# Patient Record
Sex: FEMALE | Race: WHITE | NOT HISPANIC OR LATINO | Employment: OTHER | URBAN - METROPOLITAN AREA
[De-identification: names, ages, dates, MRNs, and addresses within clinical notes are randomized per-mention and may not be internally consistent; named-entity substitution may affect disease eponyms.]

---

## 2023-02-28 ENCOUNTER — DOCUMENTATION (OUTPATIENT)
Dept: PRIMARY CARE | Facility: CLINIC | Age: 68
End: 2023-02-28
Payer: MEDICARE

## 2023-03-06 ENCOUNTER — TELEPHONE (OUTPATIENT)
Dept: PRIMARY CARE | Facility: CLINIC | Age: 68
End: 2023-03-06
Payer: MEDICARE

## 2023-03-06 DIAGNOSIS — Z79.4 TYPE 2 DIABETES MELLITUS WITH HYPERGLYCEMIA, WITH LONG-TERM CURRENT USE OF INSULIN (MULTI): Primary | ICD-10-CM

## 2023-03-06 DIAGNOSIS — E11.65 TYPE 2 DIABETES MELLITUS WITH HYPERGLYCEMIA, WITH LONG-TERM CURRENT USE OF INSULIN (MULTI): Primary | ICD-10-CM

## 2023-03-06 RX ORDER — GLIPIZIDE 5 MG/1
5 TABLET ORAL DAILY
COMMUNITY
Start: 2022-09-12 | End: 2023-06-13 | Stop reason: SDUPTHER

## 2023-03-06 RX ORDER — EZETIMIBE 10 MG/1
1 TABLET ORAL DAILY
COMMUNITY
Start: 2022-11-17 | End: 2023-08-15 | Stop reason: ALTCHOICE

## 2023-03-06 RX ORDER — CLOPIDOGREL BISULFATE 75 MG/1
1 TABLET ORAL DAILY
COMMUNITY
Start: 2022-04-21 | End: 2023-05-22 | Stop reason: ALTCHOICE

## 2023-03-06 RX ORDER — HYDROCORTISONE 25 MG/G
CREAM TOPICAL
COMMUNITY
Start: 2022-06-10

## 2023-03-06 RX ORDER — LISINOPRIL 20 MG/1
1 TABLET ORAL DAILY
COMMUNITY
End: 2023-06-13 | Stop reason: SDUPTHER

## 2023-03-06 RX ORDER — SEMAGLUTIDE 2.68 MG/ML
2 INJECTION, SOLUTION SUBCUTANEOUS
Qty: 3 ML | Refills: 5 | Status: SHIPPED | OUTPATIENT
Start: 2023-03-06 | End: 2023-03-07 | Stop reason: SDUPTHER

## 2023-03-06 RX ORDER — ASPIRIN 81 MG/1
1 TABLET ORAL DAILY
COMMUNITY
Start: 2022-03-10

## 2023-03-06 RX ORDER — CITALOPRAM 20 MG/1
1 TABLET, FILM COATED ORAL DAILY
COMMUNITY
Start: 2019-07-11 | End: 2024-02-14 | Stop reason: SDUPTHER

## 2023-03-06 NOTE — TELEPHONE ENCOUNTER
Pt called she said she had spoken to the pharmacist Jono and she has decided to increase her Ozempic to 2mg she says there is no more cost to her.  She says if you have any questions just call her.

## 2023-03-07 DIAGNOSIS — E11.9 DIABETES MELLITUS WITHOUT COMPLICATION (MULTI): Primary | ICD-10-CM

## 2023-03-07 RX ORDER — SEMAGLUTIDE 2.68 MG/ML
2 INJECTION, SOLUTION SUBCUTANEOUS
Qty: 3 ML | Refills: 5 | Status: SHIPPED | OUTPATIENT
Start: 2023-03-07 | End: 2023-08-11 | Stop reason: SDUPTHER

## 2023-03-07 RX ORDER — SEMAGLUTIDE 2.68 MG/ML
2 INJECTION, SOLUTION SUBCUTANEOUS
Qty: 3 ML | Refills: 3 | Status: CANCELLED | OUTPATIENT
Start: 2023-03-07 | End: 2023-06-05

## 2023-03-07 NOTE — PROGRESS NOTES
Prescription for Ozempic 2 mg sent to [facility] pharmacy on 3/7/2023.     Prescription did not transmit properly on 3/6/2023 due to systemwide transmission errors.

## 2023-03-09 ENCOUNTER — PATIENT OUTREACH (OUTPATIENT)
Dept: PRIMARY CARE | Facility: CLINIC | Age: 68
End: 2023-03-09
Payer: MEDICARE

## 2023-03-09 ENCOUNTER — TELEPHONE (OUTPATIENT)
Dept: PHARMACY | Facility: HOSPITAL | Age: 68
End: 2023-03-09
Payer: MEDICARE

## 2023-03-09 DIAGNOSIS — I10 HYPERTENSION, UNSPECIFIED TYPE: ICD-10-CM

## 2023-03-09 PROCEDURE — 99490 CHRNC CARE MGMT STAFF 1ST 20: CPT | Performed by: INTERNAL MEDICINE

## 2023-03-09 ASSESSMENT — ACTIVITIES OF DAILY LIVING (ADL): BATHING: YES

## 2023-03-09 NOTE — PROGRESS NOTES
"Outreach today.   Doing well. Discussed options for nutritional /dietary support. Patient is not interested at this time.   Outreach with pharmacist is \"great\" Good additional support.   Aware new CCM with be starting and aware they will outreach in the next month  "

## 2023-03-09 NOTE — TELEPHONE ENCOUNTER
Ozempic 2 mg will be delivered on 3/22/2023      Will follow up with patient on 4/5/2023 to review tolerability of Ozempic 2 mg. Advised patient to double up Ozempic 1 mg prescriptions until Ozempic 2 mg is received.

## 2023-04-21 ENCOUNTER — PATIENT OUTREACH (OUTPATIENT)
Dept: PRIMARY CARE | Facility: CLINIC | Age: 68
End: 2023-04-21
Payer: MEDICARE

## 2023-04-21 DIAGNOSIS — I10 HYPERTENSION, UNSPECIFIED TYPE: ICD-10-CM

## 2023-04-21 NOTE — PROGRESS NOTES
I called and spoke to the patient briefly about how she is doing. She said everything is going well. She is not in need of anything at this time. She talked with the pharmacist and got everything worked out with him. She requested I email her with my contact information so I will do that now.

## 2023-05-17 LAB
ALANINE AMINOTRANSFERASE (SGPT) (U/L) IN SER/PLAS: 19 U/L (ref 7–45)
ALBUMIN (G/DL) IN SER/PLAS: 4.1 G/DL (ref 3.4–5)
ALKALINE PHOSPHATASE (U/L) IN SER/PLAS: 81 U/L (ref 33–136)
ANION GAP IN SER/PLAS: 10 MMOL/L (ref 10–20)
ASPARTATE AMINOTRANSFERASE (SGOT) (U/L) IN SER/PLAS: 22 U/L (ref 9–39)
BILIRUBIN TOTAL (MG/DL) IN SER/PLAS: 0.7 MG/DL (ref 0–1.2)
CALCIUM (MG/DL) IN SER/PLAS: 9.3 MG/DL (ref 8.6–10.6)
CARBON DIOXIDE, TOTAL (MMOL/L) IN SER/PLAS: 29 MMOL/L (ref 21–32)
CHLORIDE (MMOL/L) IN SER/PLAS: 106 MMOL/L (ref 98–107)
CHOLESTEROL (MG/DL) IN SER/PLAS: 161 MG/DL (ref 0–199)
CHOLESTEROL IN HDL (MG/DL) IN SER/PLAS: 43.2 MG/DL
CHOLESTEROL/HDL RATIO: 3.7
CREATININE (MG/DL) IN SER/PLAS: 0.67 MG/DL (ref 0.5–1.05)
ESTIMATED AVERAGE GLUCOSE FOR HBA1C: 177 MG/DL
GFR FEMALE: >90 ML/MIN/1.73M2
GLUCOSE (MG/DL) IN SER/PLAS: 166 MG/DL (ref 74–99)
HEMOGLOBIN A1C/HEMOGLOBIN TOTAL IN BLOOD: 7.8 %
LDL: 92 MG/DL (ref 0–99)
POTASSIUM (MMOL/L) IN SER/PLAS: 4.4 MMOL/L (ref 3.5–5.3)
PROTEIN TOTAL: 6.4 G/DL (ref 6.4–8.2)
SODIUM (MMOL/L) IN SER/PLAS: 141 MMOL/L (ref 136–145)
TRIGLYCERIDE (MG/DL) IN SER/PLAS: 131 MG/DL (ref 0–149)
UREA NITROGEN (MG/DL) IN SER/PLAS: 16 MG/DL (ref 6–23)
VLDL: 26 MG/DL (ref 0–40)

## 2023-05-22 ENCOUNTER — OFFICE VISIT (OUTPATIENT)
Dept: PRIMARY CARE | Facility: CLINIC | Age: 68
End: 2023-05-22
Payer: MEDICARE

## 2023-05-22 VITALS
SYSTOLIC BLOOD PRESSURE: 121 MMHG | BODY MASS INDEX: 38.48 KG/M2 | DIASTOLIC BLOOD PRESSURE: 68 MMHG | WEIGHT: 196 LBS | HEART RATE: 62 BPM | HEIGHT: 60 IN

## 2023-05-22 DIAGNOSIS — F41.1 GAD (GENERALIZED ANXIETY DISORDER): ICD-10-CM

## 2023-05-22 DIAGNOSIS — I10 BENIGN ESSENTIAL HYPERTENSION: ICD-10-CM

## 2023-05-22 DIAGNOSIS — E66.01 CLASS 2 SEVERE OBESITY WITH BODY MASS INDEX (BMI) OF 35 TO 39.9 WITH SERIOUS COMORBIDITY (MULTI): ICD-10-CM

## 2023-05-22 DIAGNOSIS — I25.83 CORONARY ARTERY DISEASE DUE TO LIPID RICH PLAQUE: ICD-10-CM

## 2023-05-22 DIAGNOSIS — R42 VERTIGO: ICD-10-CM

## 2023-05-22 DIAGNOSIS — E11.65 TYPE 2 DIABETES MELLITUS WITH HYPERGLYCEMIA, WITHOUT LONG-TERM CURRENT USE OF INSULIN (MULTI): Primary | ICD-10-CM

## 2023-05-22 DIAGNOSIS — M17.0 PRIMARY OSTEOARTHRITIS OF BOTH KNEES: ICD-10-CM

## 2023-05-22 DIAGNOSIS — I25.10 CORONARY ARTERY DISEASE DUE TO LIPID RICH PLAQUE: ICD-10-CM

## 2023-05-22 PROBLEM — E66.812 CLASS 2 SEVERE OBESITY WITH BODY MASS INDEX (BMI) OF 35 TO 39.9 WITH SERIOUS COMORBIDITY: Status: ACTIVE | Noted: 2023-05-22

## 2023-05-22 PROBLEM — M79.10 MUSCLE PAIN: Status: RESOLVED | Noted: 2023-05-22 | Resolved: 2023-05-22

## 2023-05-22 PROBLEM — I20.9 ANGINA PECTORIS (CMS-HCC): Status: RESOLVED | Noted: 2023-05-22 | Resolved: 2023-05-22

## 2023-05-22 PROCEDURE — 1159F MED LIST DOCD IN RCRD: CPT | Performed by: INTERNAL MEDICINE

## 2023-05-22 PROCEDURE — 3051F HG A1C>EQUAL 7.0%<8.0%: CPT | Performed by: INTERNAL MEDICINE

## 2023-05-22 PROCEDURE — 3074F SYST BP LT 130 MM HG: CPT | Performed by: INTERNAL MEDICINE

## 2023-05-22 PROCEDURE — 99214 OFFICE O/P EST MOD 30 MIN: CPT | Performed by: INTERNAL MEDICINE

## 2023-05-22 PROCEDURE — 1036F TOBACCO NON-USER: CPT | Performed by: INTERNAL MEDICINE

## 2023-05-22 PROCEDURE — 3008F BODY MASS INDEX DOCD: CPT | Performed by: INTERNAL MEDICINE

## 2023-05-22 PROCEDURE — 4010F ACE/ARB THERAPY RXD/TAKEN: CPT | Performed by: INTERNAL MEDICINE

## 2023-05-22 PROCEDURE — 3078F DIAST BP <80 MM HG: CPT | Performed by: INTERNAL MEDICINE

## 2023-05-22 RX ORDER — CIPROFLOXACIN 250 MG/1
1 TABLET, FILM COATED ORAL 2 TIMES DAILY
COMMUNITY
Start: 2022-06-10 | End: 2023-05-22 | Stop reason: ALTCHOICE

## 2023-05-22 RX ORDER — SEMAGLUTIDE 1.34 MG/ML
2 INJECTION, SOLUTION SUBCUTANEOUS ONCE
COMMUNITY
Start: 2022-09-14 | End: 2023-08-11 | Stop reason: SDUPTHER

## 2023-05-22 RX ORDER — GLYBURIDE 5 MG/1
1 TABLET ORAL 2 TIMES DAILY
COMMUNITY
Start: 2022-08-19 | End: 2023-05-22 | Stop reason: ALTCHOICE

## 2023-05-22 RX ORDER — METOPROLOL TARTRATE 25 MG/1
1 TABLET, FILM COATED ORAL DAILY
COMMUNITY
Start: 2022-03-10 | End: 2023-05-22 | Stop reason: WASHOUT

## 2023-05-22 RX ORDER — MECLIZINE HYDROCHLORIDE 25 MG/1
25 TABLET ORAL 3 TIMES DAILY PRN
Qty: 90 TABLET | Refills: 1 | Status: SHIPPED | OUTPATIENT
Start: 2023-05-22

## 2023-05-22 RX ORDER — MELOXICAM 7.5 MG/1
7.5 TABLET ORAL DAILY
Qty: 180 TABLET | Refills: 0 | Status: SHIPPED | OUTPATIENT
Start: 2023-05-22 | End: 2023-11-14 | Stop reason: SDUPTHER

## 2023-05-22 RX ORDER — CEPHALEXIN 500 MG/1
CAPSULE ORAL
COMMUNITY
Start: 2022-09-21 | End: 2023-05-22 | Stop reason: ALTCHOICE

## 2023-05-22 RX ORDER — IBUPROFEN 600 MG/1
TABLET ORAL
COMMUNITY
Start: 2022-11-11 | End: 2023-11-13 | Stop reason: WASHOUT

## 2023-05-22 RX ORDER — METOPROLOL SUCCINATE 25 MG/1
1 TABLET, EXTENDED RELEASE ORAL DAILY
COMMUNITY
Start: 2023-03-11 | End: 2024-02-15

## 2023-05-22 RX ORDER — EVOLOCUMAB 140 MG/ML
INJECTION, SOLUTION SUBCUTANEOUS
COMMUNITY
Start: 2022-09-21 | End: 2023-05-22 | Stop reason: WASHOUT

## 2023-05-22 RX ORDER — BLOOD-GLUCOSE METER
EACH MISCELLANEOUS
COMMUNITY
Start: 2019-02-06

## 2023-05-22 NOTE — PATIENT INSTRUCTIONS
Try meloxicam : take either one twice a day, or two at one time, once a day.  Take with food.  Call [facility] for scheduling colonoscopy.   See me in 3 months for wellness visit.   Get blood test done close to next visit.  Order in the lab.

## 2023-05-22 NOTE — PROGRESS NOTES
Subjective   Maria Esther Schuster is a 67 y.o. female who presents for Diabetes.    She c/o pain from arthritis . Has been planting since March in her gardens.   Her knees especially are aching.  She uses acetaminophen or ibuprofen as needed.  She wanted to know if there was something else that could help her.    She says that the ezetimibe causes her some musculoskeletal symptoms as well so she will periodically take a break from it.  Discussed goal of LDL less than 70 for diabetes with heart disease.    She started the 2 mg dose of ozempic and this seems to be helping with her sugars.  She is on max dose of ozempic.  She remains on glipizide 5 mg once daily.  Her A1c was 7.8% which was up from 7.1 in February.    She says she saw [facility] and was advised to call to schedule a colonoscopy when she was done with plavix.  She denies any chest pain or shortness of breath.    She still has vertigo periodically and would like a prescription for meclizine.  Review of Systems    Objective   /68   Pulse 62   Ht 1.524 m (5')   Wt 88.9 kg (196 lb)   BMI 38.28 kg/m²    Physical Exam  Visit Vitals  /68   Pulse 62   Ht 1.524 m (5')   Wt 88.9 kg (196 lb)   BMI 38.28 kg/m²   Smoking Status Never   BSA 1.94 m²      GEN: NAD  HEENT: normal  NECK: no adenopathy, no thyroid enlargment  LUNGS: CTAB  CV: reg S1/S2 no murmurs  EXT: no leg edema   Assessment/Plan   Problem List Items Addressed This Visit    None    #1 type 2 diabetes mellitus with hyperglycemia without long-term insulin use: Right now she is on Ozempic at 2 mg daily which is the max dose.  She is also on glipizide.  She is paying $300 a month for Ozempic since she is in the donut hole.  We could add either an SGLT2 however there be cost issue and possible  side effects.  Could also increase glipizide but that would be working against the GLP 1 med.  Check A1c again in 3 months.  2.  Hypertension: She is on lisinopril 20 mg daily and  metoprolol succinate 25 mg once daily.  3.  Coronary artery disease: She is on metoprolol succinate 25 daily.  Also on ezetimibe.  4.  Hyperlipidemia: She was not able to tolerate Repatha when it was tried.  Continue on ezetimibe for now.  Cardiology has also tried statins with her and she stopped due to side effects.  5.  BPPV: Continue on meclizine as needed.  Rx for number 90 tablets was sent.  6.  Generalized anxiety: She is taking 10 mg daily of citalopram.  See me again in 3 months for Medicare wellness visit.

## 2023-05-24 ENCOUNTER — PATIENT OUTREACH (OUTPATIENT)
Dept: PRIMARY CARE | Facility: CLINIC | Age: 68
End: 2023-05-24
Payer: MEDICARE

## 2023-06-01 ENCOUNTER — TELEPHONE (OUTPATIENT)
Dept: PHARMACY | Facility: HOSPITAL | Age: 68
End: 2023-06-01
Payer: MEDICARE

## 2023-06-01 NOTE — TELEPHONE ENCOUNTER
Patient Assistance Program Approval:     We are pleased to inform you that your application for assistance has been approved.     This approval is valid through  06/01/2024  as long as the following criteria continue to be satisfied:     Your medication (Ozempic) remains covered under your current insurance plan.   Your prescriber does not discontinue therapy.   You do not seek reimbursement from any other private or government-funded programs for the medication.    Under this program, the pharmacy will first bill your insurance plan for your indemnified specified medication. The [facility] Assistance Fund will then offset your copay balance, so that your out-of pocket expense for your specialty medication will be $0.00.    Chris Maurer, PharmD  [phone]

## 2023-06-12 ENCOUNTER — TELEPHONE (OUTPATIENT)
Dept: PRIMARY CARE | Facility: CLINIC | Age: 68
End: 2023-06-12
Payer: MEDICARE

## 2023-06-12 RX ORDER — GLIPIZIDE 5 MG/1
5 TABLET ORAL DAILY
Qty: 90 TABLET | Refills: 0 | Status: CANCELLED | OUTPATIENT
Start: 2023-06-12

## 2023-06-12 RX ORDER — LISINOPRIL 20 MG/1
20 TABLET ORAL DAILY
Qty: 90 TABLET | Refills: 0 | Status: CANCELLED | OUTPATIENT
Start: 2023-06-12

## 2023-06-12 NOTE — TELEPHONE ENCOUNTER
Pt needs a refill on Lisinopril 20 mg and Glipizide 5mg.  [facility] on [address] in [place]. [phone]  Thank you!

## 2023-06-13 DIAGNOSIS — E11.65 TYPE 2 DIABETES MELLITUS WITH HYPERGLYCEMIA, WITHOUT LONG-TERM CURRENT USE OF INSULIN (MULTI): ICD-10-CM

## 2023-06-13 DIAGNOSIS — I10 BENIGN ESSENTIAL HYPERTENSION: Primary | ICD-10-CM

## 2023-06-13 RX ORDER — LISINOPRIL 20 MG/1
20 TABLET ORAL DAILY
Qty: 90 TABLET | Refills: 3 | Status: SHIPPED | OUTPATIENT
Start: 2023-06-13 | End: 2023-11-26 | Stop reason: DRUGHIGH

## 2023-06-13 RX ORDER — GLIPIZIDE 5 MG/1
5 TABLET ORAL DAILY
Qty: 90 TABLET | Refills: 3 | Status: SHIPPED | OUTPATIENT
Start: 2023-06-13 | End: 2024-05-16 | Stop reason: SDUPTHER

## 2023-07-11 ENCOUNTER — TELEPHONE (OUTPATIENT)
Dept: PHARMACY | Facility: HOSPITAL | Age: 68
End: 2023-07-11
Payer: MEDICARE

## 2023-07-11 NOTE — TELEPHONE ENCOUNTER
Ozempic 2 mg is currently on backorder though [facility] pharmacy. Patient is enrolled with [facility] patient assistance program and has one dose remaining this week for Ozempic 2 mg. I will review our inventory on Monday 7/17/2023 to determine if the 2 mg has returned to stock. If not, I will send an updated prescription for Ozempic 1 mg.    Chris Maurer, PharmD  [phone]

## 2023-08-11 ENCOUNTER — TELEMEDICINE (OUTPATIENT)
Dept: PHARMACY | Facility: HOSPITAL | Age: 68
End: 2023-08-11
Payer: MEDICARE

## 2023-08-11 DIAGNOSIS — E11.65 TYPE 2 DIABETES MELLITUS WITH HYPERGLYCEMIA, WITHOUT LONG-TERM CURRENT USE OF INSULIN (MULTI): Primary | ICD-10-CM

## 2023-08-11 RX ORDER — SEMAGLUTIDE 2.68 MG/ML
2 INJECTION, SOLUTION SUBCUTANEOUS
Qty: 9 ML | Refills: 3 | Status: SHIPPED | OUTPATIENT
Start: 2023-08-11 | End: 2023-08-11

## 2023-08-11 NOTE — PROGRESS NOTES
Patient is sent at the request of Leighann Thomas DO for my opinion regarding Type 2 diabetes.  My final recommendations will be communicated back to the requesting provider by way of shared medical record.    Subjective     No Known Allergies    Current monitoring regimen:   Patient is using: glucometer    7 days: 145 mg/dL   14 days: 153 mg/dL  30 days: 143 mg/dL  90 days: 153 mg/dL     Any episodes of hypoglycemia? no    Adverse Effects:   None    Objective     There were no vitals taken for this visit.    Diabetes Pharmacotherapy:    Ozempic 2 mg: Inject 2 mg under the skin once weekly    Glipizide 5 mg: Take one tablet by mouth once daily      SECONDARY PREVENTION  - Statin? No - Ezetimibe 10 mg   - ACE-I/ARB? Yes - Lisinopril 20 mg   - Aspirin? Yes    Pertinent PMH Review:  - PMH of Pancreatitis: No  - PMH of Retinopathy: No  - PMH of Urinary Tract Infections: Acute historical UTI    Lab Review  Lab Results   Component Value Date    BILITOT 0.7 05/17/2023    CALCIUM 9.3 05/17/2023    CO2 29 05/17/2023     05/17/2023    CREATININE 0.67 05/17/2023    GLUCOSE 166 (H) 05/17/2023    ALKPHOS 81 05/17/2023    K 4.4 05/17/2023    PROT 6.4 05/17/2023     05/17/2023    AST 22 05/17/2023    ALT 19 05/17/2023    BUN 16 05/17/2023    ANIONGAP 10 05/17/2023    ALBUMIN 4.1 05/17/2023    GFRF >90 05/17/2023     Lab Results   Component Value Date    TRIG 131 05/17/2023    CHOL 161 05/17/2023    HDL 43.2 05/17/2023     Component      Latest Ref Rng 11/8/2022 5/17/2023   LDL      0 - 99 mg/dL 77  92      Lab Results   Component Value Date    HGBA1C 7.8 (A) 05/17/2023    HGBA1C 7.1 (A) 02/15/2023    HGBA1C 7.5 (A) 09/19/2022     The 10-year ASCVD risk score (Jose Alfredo GARDNER, et al., 2019) is: 13.7%    Values used to calculate the score:      Age: 68 years      Sex: Female      Is Non- : No      Diabetic: Yes      Tobacco smoker: No      Systolic Blood Pressure: 121 mmHg      Is BP treated: Yes       HDL Cholesterol: 43.2 mg/dL      Total Cholesterol: 161 mg/dL    Health Maintenance:   Lipid Panel: 05/17/2023 - LDL 92 mg/dL (Goal <70 mg/dL)  Urine Albumin: 09/19/2022 WNL     Drug Interactions:  Glipizide + Ozempic: Glucagon-Like Peptide-1 Agonists may enhance the hypoglycemic effect of Sulfonylureas  Ibuprofen + Meloxicam:  Nonsteroidal Anti-Inflammatory Agents may enhance the adverse/toxic effect of other Nonsteroidal Anti-Inflammatory Agents    Assessment/Plan   Problem List Items Addressed This Visit       BMI 38.0-38.9,adult    Relevant Medications    semaglutide (Ozempic) 2 mg/dose (8 mg/3 mL) pen injector     Other Visit Diagnoses       Type 2 diabetes mellitus with hyperglycemia, without long-term current use of insulin (CMS/[facility])    -  Primary    Relevant Medications    semaglutide (Ozempic) 2 mg/dose (8 mg/3 mL) pen injector          Patients diabetes is  uncontrolled  with most recent A1c of 7.8% (Goal < 7%).   Continue: Ozempic 2 mg once weekly and Glipizide 5 mg daily   Compliance at present is estimated to be good.   Follow-up: I recommend diabetes care be PRN.  PCP Follow-Up: 08/15/2023    Chris Maurer, PharmD    Continue all meds under the continuation of care with the referring provider and clinical pharmacy team.

## 2023-08-15 ENCOUNTER — OFFICE VISIT (OUTPATIENT)
Dept: PRIMARY CARE | Facility: CLINIC | Age: 68
End: 2023-08-15
Payer: MEDICARE

## 2023-08-15 VITALS
SYSTOLIC BLOOD PRESSURE: 116 MMHG | WEIGHT: 194 LBS | HEIGHT: 60 IN | HEART RATE: 64 BPM | DIASTOLIC BLOOD PRESSURE: 50 MMHG | BODY MASS INDEX: 38.09 KG/M2

## 2023-08-15 DIAGNOSIS — Z00.00 ENCOUNTER FOR PREVENTATIVE ADULT HEALTH CARE EXAMINATION: ICD-10-CM

## 2023-08-15 DIAGNOSIS — Z13.89 ENCOUNTER FOR SCREENING FOR OTHER DISORDER: ICD-10-CM

## 2023-08-15 DIAGNOSIS — Z12.31 VISIT FOR SCREENING MAMMOGRAM: Primary | ICD-10-CM

## 2023-08-15 DIAGNOSIS — Z78.0 POSTMENOPAUSAL STATE: ICD-10-CM

## 2023-08-15 DIAGNOSIS — E11.65 TYPE 2 DIABETES MELLITUS WITH HYPERGLYCEMIA, WITHOUT LONG-TERM CURRENT USE OF INSULIN (MULTI): ICD-10-CM

## 2023-08-15 LAB — POC HEMOGLOBIN A1C: 7.2 % (ref 4.2–6.5)

## 2023-08-15 PROCEDURE — 3078F DIAST BP <80 MM HG: CPT | Performed by: INTERNAL MEDICINE

## 2023-08-15 PROCEDURE — 3074F SYST BP LT 130 MM HG: CPT | Performed by: INTERNAL MEDICINE

## 2023-08-15 PROCEDURE — 83036 HEMOGLOBIN GLYCOSYLATED A1C: CPT | Performed by: INTERNAL MEDICINE

## 2023-08-15 PROCEDURE — 1170F FXNL STATUS ASSESSED: CPT | Performed by: INTERNAL MEDICINE

## 2023-08-15 PROCEDURE — G0439 PPPS, SUBSEQ VISIT: HCPCS | Performed by: INTERNAL MEDICINE

## 2023-08-15 PROCEDURE — 1160F RVW MEDS BY RX/DR IN RCRD: CPT | Performed by: INTERNAL MEDICINE

## 2023-08-15 PROCEDURE — 3051F HG A1C>EQUAL 7.0%<8.0%: CPT | Performed by: INTERNAL MEDICINE

## 2023-08-15 PROCEDURE — 1036F TOBACCO NON-USER: CPT | Performed by: INTERNAL MEDICINE

## 2023-08-15 PROCEDURE — 1126F AMNT PAIN NOTED NONE PRSNT: CPT | Performed by: INTERNAL MEDICINE

## 2023-08-15 PROCEDURE — 1159F MED LIST DOCD IN RCRD: CPT | Performed by: INTERNAL MEDICINE

## 2023-08-15 PROCEDURE — 3008F BODY MASS INDEX DOCD: CPT | Performed by: INTERNAL MEDICINE

## 2023-08-15 PROCEDURE — 4010F ACE/ARB THERAPY RXD/TAKEN: CPT | Performed by: INTERNAL MEDICINE

## 2023-08-15 PROCEDURE — G0442 ANNUAL ALCOHOL SCREEN 15 MIN: HCPCS | Performed by: INTERNAL MEDICINE

## 2023-08-15 ASSESSMENT — ACTIVITIES OF DAILY LIVING (ADL)
MANAGING_FINANCES: INDEPENDENT
BATHING: INDEPENDENT
GROCERY_SHOPPING: INDEPENDENT
TAKING_MEDICATION: INDEPENDENT
DRESSING: INDEPENDENT
DOING_HOUSEWORK: INDEPENDENT

## 2023-08-15 ASSESSMENT — ENCOUNTER SYMPTOMS: LOSS OF SENSATION IN FEET: 0

## 2023-08-15 ASSESSMENT — PAIN SCALES - GENERAL: PAINLEVEL: 0-NO PAIN

## 2023-08-15 NOTE — PROGRESS NOTES
Subjective   Reason for Visit: Maria Esther Schuster is an 68 y.o. female here for a Medicare Wellness visit.          Reviewed all medications by prescribing practitioner or clinical pharmacist (such as prescriptions, OTCs, herbal therapies and supplements) and documented in the medical record.    HPI  She continues to feel well   She has not had any shortness of breath    She mentions that allergy season is coming    Her sugars have been going good  Her last sugar was 146 from yesterday    Her Aic had improved to 7.2% from 7.8    Patient Care Team:  Leighann Thomas DO as PCP - General  Leighann Thomas DO as PCP - [employer] PCP     Review of Systems    Objective   Vitals:  /50 (BP Location: Right arm, Patient Position: Sitting, BP Cuff Size: Adult)   Pulse 64   Ht 1.524 m (5')   Wt 88 kg (194 lb)   BMI 37.89 kg/m²       Physical Exam  Visit Vitals  /50 (BP Location: Right arm, Patient Position: Sitting, BP Cuff Size: Adult)   Pulse 64   Ht 1.524 m (5')   Wt 88 kg (194 lb)   BMI 37.89 kg/m²   Smoking Status Never   BSA 1.93 m²      GEN: NAD  HEENT: normal  NECK: no adenopathy, no thyroid enlargment  LUNGS: CTAB  CV: reg S1/S2 no murmurs  EXT: no leg edema   Assessment/Plan   Problem List Items Addressed This Visit       Encounter for preventative adult health care examination    Type 2 diabetes mellitus with hyperglycemia, without long-term current use of insulin (CMS/[facility])    Relevant Orders    Lipid Panel    Hemoglobin A1C    Albumin , Urine Random    Creatinine, Urine Random    POCT glycosylated hemoglobin (Hb A1C) manually resulted (Completed)    Postmenopausal state    Relevant Orders    XR DEXA bone density     Other Visit Diagnoses       Visit for screening mammogram    -  Primary    Relevant Orders    BI mammo bilateral screening tomosynthesis

## 2023-08-15 NOTE — PATIENT INSTRUCTIONS
Your Aic was 7.2.    Remain on same meds.     See me in 3 months .    Get blood test and urine test for diabetes close to next visit.

## 2023-08-29 PROBLEM — Z78.0 POSTMENOPAUSAL STATE: Status: ACTIVE | Noted: 2023-08-29

## 2023-08-29 PROBLEM — E11.65 TYPE 2 DIABETES MELLITUS WITH HYPERGLYCEMIA, WITHOUT LONG-TERM CURRENT USE OF INSULIN (MULTI): Status: ACTIVE | Noted: 2023-08-29

## 2023-08-29 PROBLEM — Z00.00 ENCOUNTER FOR PREVENTATIVE ADULT HEALTH CARE EXAMINATION: Status: ACTIVE | Noted: 2023-08-29

## 2023-08-29 ASSESSMENT — PATIENT HEALTH QUESTIONNAIRE - PHQ9
1. LITTLE INTEREST OR PLEASURE IN DOING THINGS: NOT AT ALL
2. FEELING DOWN, DEPRESSED OR HOPELESS: NOT AT ALL
SUM OF ALL RESPONSES TO PHQ9 QUESTIONS 1 AND 2: 0

## 2023-09-07 ENCOUNTER — TELEPHONE (OUTPATIENT)
Dept: PRIMARY CARE | Facility: CLINIC | Age: 68
End: 2023-09-07
Payer: MEDICARE

## 2023-09-12 ENCOUNTER — TELEPHONE (OUTPATIENT)
Dept: PRIMARY CARE | Facility: CLINIC | Age: 68
End: 2023-09-12
Payer: MEDICARE

## 2023-09-12 NOTE — TELEPHONE ENCOUNTER
Pt called and has a cough and isn't feeling well. Home Covid test was negative. Advised her to go to Urgent care since it is so late in the day.

## 2023-10-09 ENCOUNTER — HOSPITAL ENCOUNTER (OUTPATIENT)
Dept: RADIOLOGY | Facility: CLINIC | Age: 68
Discharge: HOME | End: 2023-10-09
Payer: MEDICARE

## 2023-10-09 DIAGNOSIS — Z12.31 ENCOUNTER FOR SCREENING MAMMOGRAM FOR MALIGNANT NEOPLASM OF BREAST: ICD-10-CM

## 2023-10-09 PROCEDURE — 77063 BREAST TOMOSYNTHESIS BI: CPT

## 2023-10-09 PROCEDURE — 77067 SCR MAMMO BI INCL CAD: CPT | Mod: BILATERAL PROCEDURE | Performed by: RADIOLOGY

## 2023-10-09 PROCEDURE — 77063 BREAST TOMOSYNTHESIS BI: CPT | Mod: BILATERAL PROCEDURE | Performed by: RADIOLOGY

## 2023-10-09 PROCEDURE — 77067 SCR MAMMO BI INCL CAD: CPT

## 2023-10-10 DIAGNOSIS — R92.8 ABNORMAL MAMMOGRAM OF RIGHT BREAST: Primary | ICD-10-CM

## 2023-10-11 ENCOUNTER — TELEPHONE (OUTPATIENT)
Dept: PRIMARY CARE | Facility: CLINIC | Age: 68
End: 2023-10-11
Payer: MEDICARE

## 2023-10-11 NOTE — TELEPHONE ENCOUNTER
Patient called stating she received a phone call from our office. Cannot find one in messages or her chart. Patient thought it might be in regards to her mammogram results. Did you call.  [phone]

## 2023-10-17 ENCOUNTER — ANCILLARY PROCEDURE (OUTPATIENT)
Dept: RADIOLOGY | Facility: CLINIC | Age: 68
End: 2023-10-17
Payer: MEDICARE

## 2023-10-17 DIAGNOSIS — R92.8 ABNORMAL MAMMOGRAM OF RIGHT BREAST: ICD-10-CM

## 2023-10-17 PROCEDURE — 76642 ULTRASOUND BREAST LIMITED: CPT | Mod: RIGHT SIDE | Performed by: RADIOLOGY

## 2023-10-17 PROCEDURE — 76642 ULTRASOUND BREAST LIMITED: CPT | Mod: RT

## 2023-10-17 PROCEDURE — 77065 DX MAMMO INCL CAD UNI: CPT | Mod: RT

## 2023-10-17 PROCEDURE — G0279 TOMOSYNTHESIS, MAMMO: HCPCS | Mod: RIGHT SIDE | Performed by: RADIOLOGY

## 2023-10-17 PROCEDURE — 77065 DX MAMMO INCL CAD UNI: CPT | Mod: RIGHT SIDE | Performed by: RADIOLOGY

## 2023-10-23 ENCOUNTER — TELEPHONE (OUTPATIENT)
Dept: PRIMARY CARE | Facility: CLINIC | Age: 68
End: 2023-10-23

## 2023-10-23 ENCOUNTER — LAB (OUTPATIENT)
Dept: LAB | Facility: LAB | Age: 68
End: 2023-10-23
Payer: MEDICARE

## 2023-10-23 DIAGNOSIS — E11.65 TYPE 2 DIABETES MELLITUS WITH HYPERGLYCEMIA, WITHOUT LONG-TERM CURRENT USE OF INSULIN (MULTI): ICD-10-CM

## 2023-10-23 DIAGNOSIS — N63.10 MASS OF RIGHT BREAST, UNSPECIFIED QUADRANT: Primary | ICD-10-CM

## 2023-10-23 LAB
CHOLEST SERPL-MCNC: 223 MG/DL (ref 0–199)
CHOLESTEROL/HDL RATIO: 4.3
EST. AVERAGE GLUCOSE BLD GHB EST-MCNC: 151 MG/DL
HBA1C MFR BLD: 6.9 %
HDLC SERPL-MCNC: 51.3 MG/DL
LDLC SERPL CALC-MCNC: 138 MG/DL
NON HDL CHOLESTEROL: 172 MG/DL (ref 0–149)
T4 FREE SERPL-MCNC: 0.89 NG/DL (ref 0.78–1.48)
TRIGL SERPL-MCNC: 168 MG/DL (ref 0–149)
TSH SERPL-ACNC: 6.57 MIU/L (ref 0.44–3.98)
VLDL: 34 MG/DL (ref 0–40)

## 2023-10-23 PROCEDURE — 84439 ASSAY OF FREE THYROXINE: CPT

## 2023-10-23 PROCEDURE — 80061 LIPID PANEL: CPT

## 2023-10-23 PROCEDURE — 83036 HEMOGLOBIN GLYCOSYLATED A1C: CPT

## 2023-10-23 PROCEDURE — 36415 COLL VENOUS BLD VENIPUNCTURE: CPT

## 2023-10-23 PROCEDURE — 84443 ASSAY THYROID STIM HORMONE: CPT

## 2023-10-23 NOTE — TELEPHONE ENCOUNTER
PT came in and said she needs a referral for  biopsy from her mammogram  Maria Esther [phone]

## 2023-11-01 DIAGNOSIS — R92.8 ABNORMAL FINDING ON BREAST IMAGING: Primary | ICD-10-CM

## 2023-11-02 ENCOUNTER — TELEPHONE (OUTPATIENT)
Dept: PRIMARY CARE | Facility: CLINIC | Age: 68
End: 2023-11-02

## 2023-11-02 ENCOUNTER — LAB (OUTPATIENT)
Dept: LAB | Facility: LAB | Age: 68
End: 2023-11-02
Payer: MEDICARE

## 2023-11-02 DIAGNOSIS — E11.65 TYPE 2 DIABETES MELLITUS WITH HYPERGLYCEMIA, WITHOUT LONG-TERM CURRENT USE OF INSULIN (MULTI): ICD-10-CM

## 2023-11-02 PROBLEM — T46.6X5A STATIN MYOPATHY: Status: ACTIVE | Noted: 2023-11-02

## 2023-11-02 PROBLEM — G72.0 STATIN MYOPATHY: Status: ACTIVE | Noted: 2023-11-02

## 2023-11-02 PROCEDURE — 82043 UR ALBUMIN QUANTITATIVE: CPT

## 2023-11-02 PROCEDURE — 82570 ASSAY OF URINE CREATININE: CPT

## 2023-11-03 LAB
CREAT UR-MCNC: 135.1 MG/DL (ref 20–320)
MICROALBUMIN UR-MCNC: 7 MG/L
MICROALBUMIN/CREAT UR: 5.2 UG/MG CREAT

## 2023-11-09 NOTE — PROGRESS NOTES
Chief Complaint  Abnormal right breast imaging.    History of Present Illness    Referring Provider: William PEREIRA    68 year-old non-Ashkenazi Taoist,  female here with abnormal right breast imaging  She is here with her .    History:  1) prior cyst aspiration  2) 10 9, 2023.  Bilateral screening mammogram.  Scattered fibroglandular tissue bilaterally.  Left breast negative.  Right breast mass, BI-RADS 0.  3) 2023.  Right breast diagnostic imaging.  Scattered fibroglandular tissue.  Right breast mass is confirmed on additional mammographic views.  Several small circumscribed low-density benign masses are identified in the remainder of the right breast.  Targeted right breast ultrasound.  At 2:00 2 cm from the nipple a 0.5 x 0.5 x 0.3 cm irregular hypoechoic mass was noted that corresponds to the mass seen on mammogram.  BI-RADS 4.  At 2:00 5 cm from the nipple there is a benign cyst.  Repeat right breast ultrasound and possible biopsy is recommended.  Right axillary ultrasound has not noted.    She presents today for further management and surgical discussion.    Ob/gyn history:  Menarche 15  Menopause 47  , age of first delivery 20  no OCPs, no fertility treatments, no HRT    No family history of breast or ovarian cancer.     Review of systems  A comprehensive ROS was taken on the patient intake form and reviewed with the patient. This form is scanned into the electronic medical record.    Constitutional symptoms: Denies generalized fatigue. Denies weight change, fevers/chills, difficulty sleeping   Eyes: Denies double vision, glaucoma, cataracts.  Ear/nose/throat/mouth: Denies hearing changes, sore throat, sinus problems.  Cardiovascular: No chest pain. Denies irregular heartbeat. Denies ankle swelling.  Respiratory: No wheezing, cough, or shortness of breath.  Gastrointestinal: No abdominal pain, No nausea/vomiting. No indigestion/heartburn. No change in bowel habits. No constipation  or diarrhea.   Genitourinary: No urinary incontinence. No urinary frequency. No painful urination.  Musculoskeletal: No bone pain, no muscle pain, no joint pain.   Integumentary: No rash. No masses. No changes in moles. No easy bruising.  Neurological: No headaches. No tremors. No numbness/tingling.  Psychiatric: No anxiety. No depression.  Endocrine: No excessive thirst. Not too hot or too cold. Not tired or fatigued.   Hematological/lymphatic: No swollen glands or blood clotting problems. No bruising.     Physical exam  A chaperone was offered for all portions of the physical exam. The patient declined.     General appearance: appears stated age, alert and oriented x 3  Head: Normocephalic, atraumatic  Eyes: conjunctivae/corneas clear.  Ears: External ears are normal, hearing is grossly intact.  Lungs: normal breathing  Heart: regular   Abdomen: Soft, nontender, nondistended.  Neurologic: grossly normal  Lymph nodes: No cervical, supraclavicular or axillary lymphadenopathy bilaterally    Breast: A comprehensive breast exam was performed in the seated and supine positions. Breasts are symmetrical. Bilateral nipples are everted. There are no skin changes on arm maneuvers. Bra size: 42C   Right: no masses   Left: no masses    Results  Imaging  All breast imaging personally reviewed by me.  See HPI    Pathology  none    Impression:   1) 68 year-old non-Ashkenazi Latter day,  female here with abnormal right breast imaging  2) Co-morbidities include CAD s/p stent. HTN. DM2. Non-smoker  3) No family history of breast or ovarian cancer      Plan:   She is here with her .  Clinical exam is normal.  We reviewed her imaging and work-up today and the results.  Routine screening mammogram revealed an indeterminate right breast mass.  Follow-up ultrasound and possible biopsy is recommended.  We discussed the biopsy procedure. The radiologist in the breast center will be performing the biopsy. We discussed the risks  and benefits of the biopsy procedure.  Risks include but are not limited to bleeding, infection, injury to nearby structures and poor wound healing. A small titanium clip will be placed to bruce the biopsy site.  A soft mammogram will be performed after the biopsy.  She wishes to proceed.  We will call her when biopsy results are available; typically in 3 business days after the biopsy is performed.  We briefly reviewed the types of pathology results the biopsy could reveal; benign, atypical, discordant, malignant.  Further management will be reviewed at a follow up appointment. She knows to call sooner if she has any questions or concerns.  All questions answered.

## 2023-11-10 PROBLEM — R53.83 FATIGUE: Status: ACTIVE | Noted: 2023-11-10

## 2023-11-10 PROBLEM — R30.0 BURNING WITH URINATION: Status: ACTIVE | Noted: 2023-11-10

## 2023-11-10 PROBLEM — R07.9 CHEST PAIN: Status: ACTIVE | Noted: 2023-11-10

## 2023-11-10 PROBLEM — R05.9 COUGH: Status: ACTIVE | Noted: 2023-11-10

## 2023-11-10 PROBLEM — G89.29 CHRONIC LEFT SHOULDER PAIN: Status: ACTIVE | Noted: 2023-11-10

## 2023-11-10 PROBLEM — M54.12 CERVICAL RADICULOPATHY: Status: ACTIVE | Noted: 2023-11-10

## 2023-11-10 PROBLEM — F41.8 DEPRESSION WITH ANXIETY: Status: ACTIVE | Noted: 2023-11-10

## 2023-11-10 PROBLEM — J22 LOWER RESPIRATORY INFECTION: Status: ACTIVE | Noted: 2023-11-10

## 2023-11-10 PROBLEM — M25.512 CHRONIC LEFT SHOULDER PAIN: Status: ACTIVE | Noted: 2023-11-10

## 2023-11-10 PROBLEM — R50.9 FEVER AND CHILLS: Status: ACTIVE | Noted: 2023-11-10

## 2023-11-10 PROBLEM — R30.0 DYSURIA: Status: ACTIVE | Noted: 2023-11-10

## 2023-11-10 PROBLEM — J01.90 ACUTE SINUSITIS: Status: ACTIVE | Noted: 2023-11-10

## 2023-11-10 PROBLEM — M47.812 DEGENERATIVE JOINT DISEASE OF CERVICAL SPINE: Status: ACTIVE | Noted: 2023-11-10

## 2023-11-10 PROBLEM — R06.09 DYSPNEA ON EXERTION: Status: ACTIVE | Noted: 2023-11-10

## 2023-11-10 PROBLEM — K52.9 CHRONIC DIARRHEA: Status: ACTIVE | Noted: 2023-11-10

## 2023-11-10 PROBLEM — M54.2 NECK PAIN ON LEFT SIDE: Status: ACTIVE | Noted: 2023-11-10

## 2023-11-10 PROBLEM — E78.5 HYPERLIPIDEMIA: Status: ACTIVE | Noted: 2023-11-10

## 2023-11-10 PROBLEM — N76.0 ACUTE VAGINITIS: Status: ACTIVE | Noted: 2023-11-10

## 2023-11-10 PROBLEM — J20.9 ACUTE BRONCHITIS: Status: ACTIVE | Noted: 2023-11-10

## 2023-11-10 PROBLEM — N39.0 ACUTE UTI: Status: ACTIVE | Noted: 2023-11-10

## 2023-11-13 ENCOUNTER — ANCILLARY PROCEDURE (OUTPATIENT)
Dept: RADIOLOGY | Facility: CLINIC | Age: 68
End: 2023-11-13
Payer: MEDICARE

## 2023-11-13 ENCOUNTER — PROCEDURE VISIT (OUTPATIENT)
Dept: SURGICAL ONCOLOGY | Facility: CLINIC | Age: 68
End: 2023-11-13
Payer: MEDICARE

## 2023-11-13 VITALS
HEART RATE: 64 BPM | SYSTOLIC BLOOD PRESSURE: 126 MMHG | BODY MASS INDEX: 36.91 KG/M2 | WEIGHT: 189 LBS | TEMPERATURE: 97.2 F | DIASTOLIC BLOOD PRESSURE: 79 MMHG

## 2023-11-13 DIAGNOSIS — R92.8 ABNORMAL FINDING ON BREAST IMAGING: ICD-10-CM

## 2023-11-13 DIAGNOSIS — R92.8 OTHER ABNORMAL AND INCONCLUSIVE FINDINGS ON DIAGNOSTIC IMAGING OF BREAST: ICD-10-CM

## 2023-11-13 DIAGNOSIS — N63.10 MASS OF RIGHT BREAST, UNSPECIFIED QUADRANT: ICD-10-CM

## 2023-11-13 DIAGNOSIS — N63.0 BREAST MASS: ICD-10-CM

## 2023-11-13 DIAGNOSIS — R92.8 ABNORMAL FINDING ON BREAST IMAGING: Primary | ICD-10-CM

## 2023-11-13 PROCEDURE — 77065 DX MAMMO INCL CAD UNI: CPT | Mod: RT

## 2023-11-13 PROCEDURE — 76642 ULTRASOUND BREAST LIMITED: CPT | Mod: RIGHT SIDE | Performed by: STUDENT IN AN ORGANIZED HEALTH CARE EDUCATION/TRAINING PROGRAM

## 2023-11-13 PROCEDURE — 88305 TISSUE EXAM BY PATHOLOGIST: CPT | Mod: TC,AHULAB | Performed by: SURGERY

## 2023-11-13 PROCEDURE — 19083 BX BREAST 1ST LESION US IMAG: CPT | Mod: RIGHT SIDE | Performed by: STUDENT IN AN ORGANIZED HEALTH CARE EDUCATION/TRAINING PROGRAM

## 2023-11-13 PROCEDURE — 76642 ULTRASOUND BREAST LIMITED: CPT | Mod: RT

## 2023-11-13 PROCEDURE — 99214 OFFICE O/P EST MOD 30 MIN: CPT | Mod: 25 | Performed by: SURGERY

## 2023-11-13 PROCEDURE — 19083 BX BREAST 1ST LESION US IMAG: CPT | Mod: RT

## 2023-11-13 PROCEDURE — 99204 OFFICE O/P NEW MOD 45 MIN: CPT | Performed by: SURGERY

## 2023-11-13 PROCEDURE — 2500000005 HC RX 250 GENERAL PHARMACY W/O HCPCS: Performed by: STUDENT IN AN ORGANIZED HEALTH CARE EDUCATION/TRAINING PROGRAM

## 2023-11-13 PROCEDURE — 88305 TISSUE EXAM BY PATHOLOGIST: CPT | Mod: TC,SUR,AHULAB | Performed by: SURGERY

## 2023-11-13 PROCEDURE — 96372 THER/PROPH/DIAG INJ SC/IM: CPT | Performed by: STUDENT IN AN ORGANIZED HEALTH CARE EDUCATION/TRAINING PROGRAM

## 2023-11-13 PROCEDURE — 88305 TISSUE EXAM BY PATHOLOGIST: CPT | Performed by: PATHOLOGY

## 2023-11-13 PROCEDURE — 77065 DX MAMMO INCL CAD UNI: CPT | Mod: RIGHT SIDE | Performed by: STUDENT IN AN ORGANIZED HEALTH CARE EDUCATION/TRAINING PROGRAM

## 2023-11-13 PROCEDURE — 2720000007 HC OR 272 NO HCPCS

## 2023-11-13 RX ORDER — LIDOCAINE HYDROCHLORIDE 10 MG/ML
10 INJECTION INFILTRATION; PERINEURAL ONCE
Status: COMPLETED | OUTPATIENT
Start: 2023-11-13 | End: 2023-11-13

## 2023-11-13 RX ADMIN — LIDOCAINE HYDROCHLORIDE 10 ML: 10 INJECTION, SOLUTION INFILTRATION; PERINEURAL at 15:05

## 2023-11-13 ASSESSMENT — PAIN SCALES - GENERAL
PAINLEVEL_OUTOF10: 0 - NO PAIN
PAINLEVEL_OUTOF10: 0 - NO PAIN
PAINLEVEL_OUTOF10: 1
PAINLEVEL: 0-NO PAIN
PAINLEVEL_OUTOF10: 0 - NO PAIN
PAINLEVEL_OUTOF10: 0 - NO PAIN

## 2023-11-13 NOTE — Clinical Note
Pressure held x 10 minutes, incision dry, steri strips intact and compression dressing applied. Ice pack applied.

## 2023-11-13 NOTE — DISCHARGE INSTRUCTIONS
AFTER THE TEST  A steri-strip and bandage will be placed over the incision. You may shower after 24 hours. Remove bandage after 24 hours. Remove bandage after the shower. Leave the steri-strips in place to fall off on their own. If after 1 week the steri-strips are still on, you may remove them. Avoid swimming or soaking in tub for 3 days.     You may have mild discomfort at the test site. If needed, you may take Tylenol (Acetaminophen) for pain. Please avoid taking NSAIDs, Motrin, Advil, Aleve, or ibuprofen for 24 hours following the biopsy. After 24 hours you may resume NSAIDSs.     If you take aspirin, Plavix, Coumadin, Xarelto or Eliquis please tell us. If these medications were stopped by your provider, please ask them when to resume.     You may have some tenderness, bruising or slight bleeding at the site. Please apply ice packs to the site for 15 minutes on and 15 minutes off for a 2 hour minimum.     Most people can return to their usual routine after the procedure. Avoid Strenuous activity for 24 hours.     Sleep in a bra the night after your biopsy. Continue to do so for comfort.     Call your doctor if you have any of the following symptoms :  Fever  Increased pain  Increased bleeding  Redness  Increased swelling  Yellowish drainage  Your doctor will get the biopsy results within 5 - 7 days. Call your doctor with any questions.     Patient education brochure and pain/comfort measures have been reviewed.   Phone number provided to contact Breast Center if problems arise.     Patient verbalized understanding of home going instructions.

## 2023-11-13 NOTE — Clinical Note
Patient offered aromatherapy, warm blankets and music. Guided imagery, touch and relaxation breathing to be used throughout the procedure.

## 2023-11-14 ENCOUNTER — OFFICE VISIT (OUTPATIENT)
Dept: PRIMARY CARE | Facility: CLINIC | Age: 68
End: 2023-11-14
Payer: MEDICARE

## 2023-11-14 VITALS
DIASTOLIC BLOOD PRESSURE: 80 MMHG | HEART RATE: 63 BPM | BODY MASS INDEX: 37.3 KG/M2 | RESPIRATION RATE: 16 BRPM | WEIGHT: 190 LBS | SYSTOLIC BLOOD PRESSURE: 120 MMHG | HEIGHT: 60 IN

## 2023-11-14 DIAGNOSIS — Z23 FLU VACCINE NEED: ICD-10-CM

## 2023-11-14 DIAGNOSIS — E11.65 TYPE 2 DIABETES MELLITUS WITH HYPERGLYCEMIA, WITHOUT LONG-TERM CURRENT USE OF INSULIN (MULTI): ICD-10-CM

## 2023-11-14 DIAGNOSIS — M17.0 PRIMARY OSTEOARTHRITIS OF BOTH KNEES: ICD-10-CM

## 2023-11-14 DIAGNOSIS — D72.819 LEUKOPENIA, UNSPECIFIED TYPE: ICD-10-CM

## 2023-11-14 DIAGNOSIS — I10 BENIGN ESSENTIAL HYPERTENSION: ICD-10-CM

## 2023-11-14 DIAGNOSIS — R94.6 ABNORMAL FINDING ON THYROID FUNCTION TEST: Primary | ICD-10-CM

## 2023-11-14 DIAGNOSIS — R53.83 FATIGUE, UNSPECIFIED TYPE: ICD-10-CM

## 2023-11-14 PROCEDURE — 3061F NEG MICROALBUMINURIA REV: CPT | Performed by: INTERNAL MEDICINE

## 2023-11-14 PROCEDURE — G0008 ADMIN INFLUENZA VIRUS VAC: HCPCS | Performed by: INTERNAL MEDICINE

## 2023-11-14 PROCEDURE — 1125F AMNT PAIN NOTED PAIN PRSNT: CPT | Performed by: INTERNAL MEDICINE

## 2023-11-14 PROCEDURE — 99214 OFFICE O/P EST MOD 30 MIN: CPT | Performed by: INTERNAL MEDICINE

## 2023-11-14 PROCEDURE — 3074F SYST BP LT 130 MM HG: CPT | Performed by: INTERNAL MEDICINE

## 2023-11-14 PROCEDURE — 3008F BODY MASS INDEX DOCD: CPT | Performed by: INTERNAL MEDICINE

## 2023-11-14 PROCEDURE — 3079F DIAST BP 80-89 MM HG: CPT | Performed by: INTERNAL MEDICINE

## 2023-11-14 PROCEDURE — 4010F ACE/ARB THERAPY RXD/TAKEN: CPT | Performed by: INTERNAL MEDICINE

## 2023-11-14 PROCEDURE — 3050F LDL-C >= 130 MG/DL: CPT | Performed by: INTERNAL MEDICINE

## 2023-11-14 PROCEDURE — 90662 IIV NO PRSV INCREASED AG IM: CPT | Performed by: INTERNAL MEDICINE

## 2023-11-14 PROCEDURE — 1159F MED LIST DOCD IN RCRD: CPT | Performed by: INTERNAL MEDICINE

## 2023-11-14 PROCEDURE — 1160F RVW MEDS BY RX/DR IN RCRD: CPT | Performed by: INTERNAL MEDICINE

## 2023-11-14 PROCEDURE — 3044F HG A1C LEVEL LT 7.0%: CPT | Performed by: INTERNAL MEDICINE

## 2023-11-14 PROCEDURE — 1036F TOBACCO NON-USER: CPT | Performed by: INTERNAL MEDICINE

## 2023-11-14 RX ORDER — MELOXICAM 7.5 MG/1
7.5 TABLET ORAL DAILY
Qty: 90 TABLET | Refills: 3 | Status: SHIPPED | OUTPATIENT
Start: 2023-11-14

## 2023-11-14 ASSESSMENT — PAIN SCALES - GENERAL: PAINLEVEL: 2

## 2023-11-14 ASSESSMENT — PATIENT HEALTH QUESTIONNAIRE - PHQ9
SUM OF ALL RESPONSES TO PHQ9 QUESTIONS 1 AND 2: 0
1. LITTLE INTEREST OR PLEASURE IN DOING THINGS: NOT AT ALL
2. FEELING DOWN, DEPRESSED OR HOPELESS: NOT AT ALL

## 2023-11-14 NOTE — PATIENT INSTRUCTIONS
Remain on Lisinopril , but take just one half tablet every evening.     Other meds all stay the same.    Your thyroid test was slightly abnormal, we will recheck in 3 months to see if you need a thyroid medication .     See me in 3 months.     Get blood test about a week before that visit.

## 2023-11-14 NOTE — PROGRESS NOTES
Subjective   Maria Esther Schuster is a 68 y.o. female who presents for Follow-up (Pt here to review labs).    She says that she stopped lisinopril 3 or 4 days ago   Was exercising, has been slowly losing weight  She felt her bp was good and that it was making her tired, was feeling sleepy in the afternoon  She is taking metoprolol at evening    She had breast biopsy yesterday at [facility] with Dr Camacho  She is sore today, has icepak    She's stopping nighttime eating  Is trying to exercise more at gym  Avoiding bread, eating smaller meals   She is taking ozempic at full dose     Her A1c improved from 7.8 to 6.9 now  She is checking her sugars at home    Review of Systems    Objective   /80 (BP Location: Left arm, Patient Position: Sitting, BP Cuff Size: Large adult)   Pulse 63   Resp 16   Ht 1.524 m (5')   Wt 86.2 kg (190 lb)   BMI 37.11 kg/m²    Physical Exam  Visit Vitals  /80 (BP Location: Left arm, Patient Position: Sitting, BP Cuff Size: Large adult)   Pulse 63   Resp 16   Ht 1.524 m (5')   Wt 86.2 kg (190 lb)   BMI 37.11 kg/m²   OB Status Postmenopausal   Smoking Status Never   BSA 1.91 m²      GEN: NAD  HEENT: normal  NECK: no adenopathy, no thyroid enlargment  LUNGS: CTAB  CV: reg S1/S2 no murmurs  EXT: no leg edema   Assessment/Plan   Problem List Items Addressed This Visit       Benign essential hypertension recommend taking only one half tab lisinopril. She also has DM and CAD and this has more benefit for her.     Relevant Medications    lisinopril 20 mg tablet    Type 2 diabetes mellitus with hyperglycemia, without long-term current use of insulin (CMS/[facility]) remain on ozempic and glipizide 5 mg     Relevant Medications    lisinopril 20 mg tablet    Other Relevant Orders    Comprehensive Metabolic Panel    Fatigue    Relevant Orders    TSH     Other Visit Diagnoses       Abnormal finding on thyroid function test    -  Primary    Relevant Orders    TSH    T4, free    Thyroid Peroxidase (TPO) Antibody     Flu vaccine need        Relevant Orders    Flu vaccine, quadrivalent, high-dose, preservative free, age 65y+ (FLUZONE) (Completed)    Leukopenia, unspecified type        Relevant Orders    CBC and Auto Differential    Primary osteoarthritis of both knees        Relevant Medications    meloxicam (Mobic) 7.5 mg tablet

## 2023-11-16 ENCOUNTER — TELEPHONE (OUTPATIENT)
Dept: RADIOLOGY | Facility: CLINIC | Age: 68
End: 2023-11-16
Payer: MEDICARE

## 2023-11-17 LAB
LABORATORY COMMENT REPORT: NORMAL
PATH REPORT.FINAL DX SPEC: NORMAL
PATH REPORT.GROSS SPEC: NORMAL
PATH REPORT.RELEVANT HX SPEC: NORMAL
PATH REPORT.TOTAL CANCER: NORMAL

## 2023-11-20 ENCOUNTER — TELEPHONE (OUTPATIENT)
Dept: SURGICAL ONCOLOGY | Facility: CLINIC | Age: 68
End: 2023-11-20
Payer: MEDICARE

## 2023-11-20 DIAGNOSIS — R92.8 ABNORMAL FINDING ON BREAST IMAGING: ICD-10-CM

## 2023-11-20 NOTE — TELEPHONE ENCOUNTER
Spoke with Ms. Schuster regarding right breast pathology. Final pathology reveals apocrine metaplasia and cysts, microscopic intraductal papilloma, with usual ductal hyperplasia. This is benign and concordant with imaging. Radiologist recommendation is for additional area seen on screening mammogram in the right breast, considered probably benign. Recommendation for 6 mo follow up right mammogram and ultrasound. This will be scheduled for her. All questions answered.

## 2023-11-26 RX ORDER — LISINOPRIL 20 MG/1
10 TABLET ORAL DAILY
Qty: 45 TABLET | Refills: 1 | Status: SHIPPED | OUTPATIENT
Start: 2023-11-26 | End: 2024-05-16 | Stop reason: SDUPTHER

## 2023-11-27 ENCOUNTER — PHARMACY VISIT (OUTPATIENT)
Dept: PHARMACY | Facility: CLINIC | Age: 68
End: 2023-11-27
Payer: MEDICARE

## 2023-11-27 PROCEDURE — RXMED WILLOW AMBULATORY MEDICATION CHARGE

## 2023-12-17 PROBLEM — M60.9 STATIN-INDUCED MYOSITIS: Status: ACTIVE | Noted: 2023-12-17

## 2023-12-17 PROBLEM — T46.6X5A STATIN-INDUCED MYOSITIS: Status: ACTIVE | Noted: 2023-12-17

## 2024-02-06 ENCOUNTER — LAB (OUTPATIENT)
Dept: LAB | Facility: LAB | Age: 69
End: 2024-02-06
Payer: MEDICARE

## 2024-02-06 DIAGNOSIS — R53.83 FATIGUE, UNSPECIFIED TYPE: ICD-10-CM

## 2024-02-06 DIAGNOSIS — R94.6 ABNORMAL FINDING ON THYROID FUNCTION TEST: ICD-10-CM

## 2024-02-06 DIAGNOSIS — D72.819 LEUKOPENIA, UNSPECIFIED TYPE: ICD-10-CM

## 2024-02-06 DIAGNOSIS — E11.65 TYPE 2 DIABETES MELLITUS WITH HYPERGLYCEMIA, WITHOUT LONG-TERM CURRENT USE OF INSULIN (MULTI): ICD-10-CM

## 2024-02-06 PROCEDURE — 84439 ASSAY OF FREE THYROXINE: CPT

## 2024-02-06 PROCEDURE — 84443 ASSAY THYROID STIM HORMONE: CPT

## 2024-02-06 PROCEDURE — 36415 COLL VENOUS BLD VENIPUNCTURE: CPT

## 2024-02-06 PROCEDURE — 86376 MICROSOMAL ANTIBODY EACH: CPT

## 2024-02-06 PROCEDURE — 80053 COMPREHEN METABOLIC PANEL: CPT

## 2024-02-06 PROCEDURE — 85025 COMPLETE CBC W/AUTO DIFF WBC: CPT

## 2024-02-07 ENCOUNTER — OFFICE (OUTPATIENT)
Dept: URBAN - METROPOLITAN AREA CLINIC 27 | Facility: CLINIC | Age: 69
End: 2024-02-07
Payer: MEDICARE

## 2024-02-07 VITALS
SYSTOLIC BLOOD PRESSURE: 136 MMHG | DIASTOLIC BLOOD PRESSURE: 75 MMHG | TEMPERATURE: 98 F | WEIGHT: 191 LBS | HEART RATE: 64 BPM

## 2024-02-07 DIAGNOSIS — K59.09 OTHER CONSTIPATION: ICD-10-CM

## 2024-02-07 DIAGNOSIS — Z86.010 PERSONAL HISTORY OF COLONIC POLYPS: ICD-10-CM

## 2024-02-07 DIAGNOSIS — E11.9 TYPE 2 DIABETES MELLITUS WITHOUT COMPLICATIONS: ICD-10-CM

## 2024-02-07 DIAGNOSIS — Z09 ENCOUNTER FOR FOLLOW-UP EXAMINATION AFTER COMPLETED TREATMEN: ICD-10-CM

## 2024-02-07 LAB
ALBUMIN SERPL BCP-MCNC: 4.2 G/DL (ref 3.4–5)
ALP SERPL-CCNC: 74 U/L (ref 33–136)
ALT SERPL W P-5'-P-CCNC: 15 U/L (ref 7–45)
ANION GAP SERPL CALC-SCNC: 15 MMOL/L (ref 10–20)
AST SERPL W P-5'-P-CCNC: 17 U/L (ref 9–39)
BASOPHILS # BLD AUTO: 0.05 X10*3/UL (ref 0–0.1)
BASOPHILS NFR BLD AUTO: 0.5 %
BILIRUB SERPL-MCNC: 0.6 MG/DL (ref 0–1.2)
BUN SERPL-MCNC: 15 MG/DL (ref 6–23)
CALCIUM SERPL-MCNC: 9.4 MG/DL (ref 8.6–10.6)
CHLORIDE SERPL-SCNC: 103 MMOL/L (ref 98–107)
CO2 SERPL-SCNC: 27 MMOL/L (ref 21–32)
CREAT SERPL-MCNC: 0.84 MG/DL (ref 0.5–1.05)
EGFRCR SERPLBLD CKD-EPI 2021: 77 ML/MIN/1.73M*2
EOSINOPHIL # BLD AUTO: 0.28 X10*3/UL (ref 0–0.7)
EOSINOPHIL NFR BLD AUTO: 3 %
ERYTHROCYTE [DISTWIDTH] IN BLOOD BY AUTOMATED COUNT: 12.1 % (ref 11.5–14.5)
GLUCOSE SERPL-MCNC: 153 MG/DL (ref 74–99)
HCT VFR BLD AUTO: 40.1 % (ref 36–46)
HGB BLD-MCNC: 13.1 G/DL (ref 12–16)
IMM GRANULOCYTES # BLD AUTO: 0.02 X10*3/UL (ref 0–0.7)
IMM GRANULOCYTES NFR BLD AUTO: 0.2 % (ref 0–0.9)
LYMPHOCYTES # BLD AUTO: 2.53 X10*3/UL (ref 1.2–4.8)
LYMPHOCYTES NFR BLD AUTO: 26.8 %
MCH RBC QN AUTO: 30.3 PG (ref 26–34)
MCHC RBC AUTO-ENTMCNC: 32.7 G/DL (ref 32–36)
MCV RBC AUTO: 93 FL (ref 80–100)
MONOCYTES # BLD AUTO: 0.6 X10*3/UL (ref 0.1–1)
MONOCYTES NFR BLD AUTO: 6.4 %
NEUTROPHILS # BLD AUTO: 5.96 X10*3/UL (ref 1.2–7.7)
NEUTROPHILS NFR BLD AUTO: 63.1 %
NRBC BLD-RTO: 0 /100 WBCS (ref 0–0)
PLATELET # BLD AUTO: 281 X10*3/UL (ref 150–450)
POTASSIUM SERPL-SCNC: 4.5 MMOL/L (ref 3.5–5.3)
PROT SERPL-MCNC: 6.5 G/DL (ref 6.4–8.2)
RBC # BLD AUTO: 4.32 X10*6/UL (ref 4–5.2)
SODIUM SERPL-SCNC: 140 MMOL/L (ref 136–145)
T4 FREE SERPL-MCNC: 0.89 NG/DL (ref 0.78–1.48)
THYROPEROXIDASE AB SERPL-ACNC: >1000 IU/ML
TSH SERPL-ACNC: 3.69 MIU/L (ref 0.44–3.98)
WBC # BLD AUTO: 9.4 X10*3/UL (ref 4.4–11.3)

## 2024-02-07 PROCEDURE — 99213 OFFICE O/P EST LOW 20 MIN: CPT | Performed by: NURSE PRACTITIONER

## 2024-02-07 RX ORDER — ONDANSETRON 4 MG/1
TABLET, ORALLY DISINTEGRATING ORAL
Qty: 3 | Refills: 0 | Status: ACTIVE
Start: 2024-02-07

## 2024-02-13 DIAGNOSIS — I10 BENIGN ESSENTIAL HYPERTENSION: ICD-10-CM

## 2024-02-14 ENCOUNTER — PHARMACY VISIT (OUTPATIENT)
Dept: PHARMACY | Facility: CLINIC | Age: 69
End: 2024-02-14
Payer: MEDICARE

## 2024-02-14 ENCOUNTER — OFFICE VISIT (OUTPATIENT)
Dept: PRIMARY CARE | Facility: CLINIC | Age: 69
End: 2024-02-14
Payer: MEDICARE

## 2024-02-14 VITALS
DIASTOLIC BLOOD PRESSURE: 68 MMHG | WEIGHT: 188.6 LBS | RESPIRATION RATE: 16 BRPM | HEART RATE: 67 BPM | HEIGHT: 60 IN | BODY MASS INDEX: 37.03 KG/M2 | SYSTOLIC BLOOD PRESSURE: 130 MMHG

## 2024-02-14 DIAGNOSIS — I25.10 CORONARY ARTERY DISEASE DUE TO LIPID RICH PLAQUE: ICD-10-CM

## 2024-02-14 DIAGNOSIS — M60.9 STATIN-INDUCED MYOSITIS: ICD-10-CM

## 2024-02-14 DIAGNOSIS — I25.83 CORONARY ARTERY DISEASE DUE TO LIPID RICH PLAQUE: ICD-10-CM

## 2024-02-14 DIAGNOSIS — F33.40 MDD (RECURRENT MAJOR DEPRESSIVE DISORDER) IN REMISSION (CMS-HCC): ICD-10-CM

## 2024-02-14 DIAGNOSIS — E06.3 HASHIMOTO'S THYROIDITIS: Primary | ICD-10-CM

## 2024-02-14 DIAGNOSIS — E66.01 CLASS 2 SEVERE OBESITY DUE TO EXCESS CALORIES WITH SERIOUS COMORBIDITY AND BODY MASS INDEX (BMI) OF 36.0 TO 36.9 IN ADULT (MULTI): ICD-10-CM

## 2024-02-14 DIAGNOSIS — I10 BENIGN ESSENTIAL HYPERTENSION: ICD-10-CM

## 2024-02-14 DIAGNOSIS — T46.6X5A STATIN-INDUCED MYOSITIS: ICD-10-CM

## 2024-02-14 DIAGNOSIS — E11.65 TYPE 2 DIABETES MELLITUS WITH HYPERGLYCEMIA, WITHOUT LONG-TERM CURRENT USE OF INSULIN (MULTI): ICD-10-CM

## 2024-02-14 DIAGNOSIS — F41.1 GAD (GENERALIZED ANXIETY DISORDER): ICD-10-CM

## 2024-02-14 LAB — POC HEMOGLOBIN A1C: 7.4 % (ref 4.2–6.5)

## 2024-02-14 PROCEDURE — 3078F DIAST BP <80 MM HG: CPT | Performed by: INTERNAL MEDICINE

## 2024-02-14 PROCEDURE — 1126F AMNT PAIN NOTED NONE PRSNT: CPT | Performed by: INTERNAL MEDICINE

## 2024-02-14 PROCEDURE — 1159F MED LIST DOCD IN RCRD: CPT | Performed by: INTERNAL MEDICINE

## 2024-02-14 PROCEDURE — RXMED WILLOW AMBULATORY MEDICATION CHARGE

## 2024-02-14 PROCEDURE — 1036F TOBACCO NON-USER: CPT | Performed by: INTERNAL MEDICINE

## 2024-02-14 PROCEDURE — 83036 HEMOGLOBIN GLYCOSYLATED A1C: CPT | Performed by: INTERNAL MEDICINE

## 2024-02-14 PROCEDURE — 4010F ACE/ARB THERAPY RXD/TAKEN: CPT | Performed by: INTERNAL MEDICINE

## 2024-02-14 PROCEDURE — 3075F SYST BP GE 130 - 139MM HG: CPT | Performed by: INTERNAL MEDICINE

## 2024-02-14 PROCEDURE — 3008F BODY MASS INDEX DOCD: CPT | Performed by: INTERNAL MEDICINE

## 2024-02-14 PROCEDURE — 99214 OFFICE O/P EST MOD 30 MIN: CPT | Performed by: INTERNAL MEDICINE

## 2024-02-14 RX ORDER — CITALOPRAM 20 MG/1
20 TABLET, FILM COATED ORAL DAILY
Qty: 90 TABLET | Refills: 3 | Status: SHIPPED | OUTPATIENT
Start: 2024-02-14

## 2024-02-14 RX ORDER — LEVOTHYROXINE SODIUM 25 UG/1
25 TABLET ORAL
Qty: 90 TABLET | Refills: 0 | Status: SHIPPED | OUTPATIENT
Start: 2024-02-14 | End: 2024-05-16 | Stop reason: SDUPTHER

## 2024-02-14 ASSESSMENT — PATIENT HEALTH QUESTIONNAIRE - PHQ9
1. LITTLE INTEREST OR PLEASURE IN DOING THINGS: NOT AT ALL
SUM OF ALL RESPONSES TO PHQ9 QUESTIONS 1 AND 2: 0
2. FEELING DOWN, DEPRESSED OR HOPELESS: NOT AT ALL

## 2024-02-14 ASSESSMENT — PAIN SCALES - GENERAL: PAINLEVEL: 0-NO PAIN

## 2024-02-14 NOTE — PROGRESS NOTES
Subjective   Maria Esther Schuster is a 68 y.o. female who presents for Follow-up.    Her A1c was 7.4    She has been working on exercise and she's getting faster with walking a mile    She'd been having fluctuation of her TSH  Her TPO antibodies were elevated   It seem she has some thyroiditis  She is having trouble falling asleep   She feels tired easily and is falling asleep in the middle of the day  She would like to begin thyroid medication    Review of Systems    Objective   /68   Pulse 67   Resp 16   Ht 1.524 m (5')   Wt 85.5 kg (188 lb 9.6 oz)   BMI 36.83 kg/m²    Physical Exam       GEN: NAD  HEENT: normal  NECK: no adenopathy, no thyroid enlargment  LUNGS: CTAB  CV: reg S1/S2 no murmurs  EXT: no leg edema   Assessment/Plan     Type 2 diabetes with hyperglycemia w/o long term use of insulin: She is not sure why her A1c went up.  She is still using semaglutide 2 mg/week and is taking glipizide 5 mg daily.  She is checking her sugars at home.  Hashimoto's thyroiditis begin levothyroxine 25 mcg daily.  Take first thing in the morning before any other pills.  Will recheck her TSH again in 3 months.  Benign hypertension remain on lisinopril and metoprolol.  CAD remain on metoprolol.  She cannot tolerate statins.   Statin induced myopathy  6.   Class 2 severe obesity with serious comorbidity  See me again in 3 months or sooner if needed.

## 2024-02-14 NOTE — PATIENT INSTRUCTIONS
Begin taking LEVOTHYROXINE 25 mcg one every morning on empty stomach, take it BY ITSELF, separate from other pills.    Remain on other medications the same.     See me again in 3 months.    Get blood test done after fasting close to next visit.

## 2024-02-15 RX ORDER — METOPROLOL SUCCINATE 25 MG/1
25 TABLET, EXTENDED RELEASE ORAL DAILY
Qty: 90 TABLET | Refills: 3 | Status: SHIPPED | OUTPATIENT
Start: 2024-02-15

## 2024-03-07 VITALS
DIASTOLIC BLOOD PRESSURE: 67 MMHG | OXYGEN SATURATION: 100 % | DIASTOLIC BLOOD PRESSURE: 69 MMHG | HEART RATE: 68 BPM | DIASTOLIC BLOOD PRESSURE: 48 MMHG | SYSTOLIC BLOOD PRESSURE: 144 MMHG | RESPIRATION RATE: 12 BRPM | DIASTOLIC BLOOD PRESSURE: 56 MMHG | RESPIRATION RATE: 9 BRPM | DIASTOLIC BLOOD PRESSURE: 52 MMHG | RESPIRATION RATE: 10 BRPM | RESPIRATION RATE: 9 BRPM | SYSTOLIC BLOOD PRESSURE: 119 MMHG | HEART RATE: 81 BPM | DIASTOLIC BLOOD PRESSURE: 65 MMHG | WEIGHT: 193 LBS | SYSTOLIC BLOOD PRESSURE: 144 MMHG | SYSTOLIC BLOOD PRESSURE: 105 MMHG | HEART RATE: 70 BPM | OXYGEN SATURATION: 97 % | SYSTOLIC BLOOD PRESSURE: 129 MMHG | SYSTOLIC BLOOD PRESSURE: 129 MMHG | SYSTOLIC BLOOD PRESSURE: 118 MMHG | DIASTOLIC BLOOD PRESSURE: 62 MMHG | HEART RATE: 78 BPM | WEIGHT: 193 LBS | DIASTOLIC BLOOD PRESSURE: 53 MMHG | DIASTOLIC BLOOD PRESSURE: 53 MMHG | OXYGEN SATURATION: 99 % | TEMPERATURE: 98.9 F | RESPIRATION RATE: 16 BRPM | HEART RATE: 80 BPM | SYSTOLIC BLOOD PRESSURE: 113 MMHG | RESPIRATION RATE: 16 BRPM | HEART RATE: 77 BPM | RESPIRATION RATE: 13 BRPM | HEART RATE: 75 BPM | HEART RATE: 67 BPM | HEART RATE: 77 BPM | SYSTOLIC BLOOD PRESSURE: 115 MMHG | DIASTOLIC BLOOD PRESSURE: 56 MMHG | TEMPERATURE: 98.9 F | HEART RATE: 75 BPM | RESPIRATION RATE: 13 BRPM | DIASTOLIC BLOOD PRESSURE: 65 MMHG | WEIGHT: 193 LBS | HEART RATE: 59 BPM | DIASTOLIC BLOOD PRESSURE: 48 MMHG | DIASTOLIC BLOOD PRESSURE: 48 MMHG | DIASTOLIC BLOOD PRESSURE: 65 MMHG | RESPIRATION RATE: 13 BRPM | DIASTOLIC BLOOD PRESSURE: 52 MMHG | DIASTOLIC BLOOD PRESSURE: 59 MMHG | HEART RATE: 75 BPM | OXYGEN SATURATION: 98 % | SYSTOLIC BLOOD PRESSURE: 133 MMHG | OXYGEN SATURATION: 96 % | DIASTOLIC BLOOD PRESSURE: 69 MMHG | SYSTOLIC BLOOD PRESSURE: 113 MMHG | SYSTOLIC BLOOD PRESSURE: 109 MMHG | DIASTOLIC BLOOD PRESSURE: 59 MMHG | DIASTOLIC BLOOD PRESSURE: 56 MMHG | DIASTOLIC BLOOD PRESSURE: 62 MMHG | DIASTOLIC BLOOD PRESSURE: 62 MMHG | HEART RATE: 79 BPM | DIASTOLIC BLOOD PRESSURE: 63 MMHG | SYSTOLIC BLOOD PRESSURE: 119 MMHG | OXYGEN SATURATION: 99 % | OXYGEN SATURATION: 96 % | HEART RATE: 80 BPM | RESPIRATION RATE: 10 BRPM | SYSTOLIC BLOOD PRESSURE: 125 MMHG | RESPIRATION RATE: 11 BRPM | DIASTOLIC BLOOD PRESSURE: 72 MMHG | DIASTOLIC BLOOD PRESSURE: 67 MMHG | SYSTOLIC BLOOD PRESSURE: 129 MMHG | DIASTOLIC BLOOD PRESSURE: 67 MMHG | SYSTOLIC BLOOD PRESSURE: 119 MMHG | HEART RATE: 59 BPM | DIASTOLIC BLOOD PRESSURE: 63 MMHG | SYSTOLIC BLOOD PRESSURE: 118 MMHG | HEART RATE: 81 BPM | SYSTOLIC BLOOD PRESSURE: 105 MMHG | RESPIRATION RATE: 12 BRPM | HEART RATE: 77 BPM | OXYGEN SATURATION: 98 % | RESPIRATION RATE: 9 BRPM | OXYGEN SATURATION: 97 % | SYSTOLIC BLOOD PRESSURE: 133 MMHG | OXYGEN SATURATION: 97 % | SYSTOLIC BLOOD PRESSURE: 133 MMHG | RESPIRATION RATE: 10 BRPM | DIASTOLIC BLOOD PRESSURE: 72 MMHG | SYSTOLIC BLOOD PRESSURE: 115 MMHG | SYSTOLIC BLOOD PRESSURE: 109 MMHG | HEART RATE: 68 BPM | DIASTOLIC BLOOD PRESSURE: 69 MMHG | HEART RATE: 67 BPM | OXYGEN SATURATION: 100 % | RESPIRATION RATE: 11 BRPM | SYSTOLIC BLOOD PRESSURE: 113 MMHG | SYSTOLIC BLOOD PRESSURE: 125 MMHG | HEART RATE: 78 BPM | DIASTOLIC BLOOD PRESSURE: 59 MMHG | RESPIRATION RATE: 16 BRPM | SYSTOLIC BLOOD PRESSURE: 118 MMHG | HEART RATE: 70 BPM | DIASTOLIC BLOOD PRESSURE: 53 MMHG | RESPIRATION RATE: 12 BRPM | HEART RATE: 70 BPM | RESPIRATION RATE: 11 BRPM | HEART RATE: 79 BPM | OXYGEN SATURATION: 99 % | HEART RATE: 80 BPM | OXYGEN SATURATION: 98 % | TEMPERATURE: 98.9 F | SYSTOLIC BLOOD PRESSURE: 105 MMHG | SYSTOLIC BLOOD PRESSURE: 144 MMHG | OXYGEN SATURATION: 100 % | HEART RATE: 67 BPM | SYSTOLIC BLOOD PRESSURE: 125 MMHG | HEART RATE: 81 BPM | HEART RATE: 68 BPM | HEART RATE: 79 BPM | SYSTOLIC BLOOD PRESSURE: 115 MMHG | SYSTOLIC BLOOD PRESSURE: 109 MMHG | DIASTOLIC BLOOD PRESSURE: 52 MMHG | HEART RATE: 78 BPM | HEART RATE: 59 BPM | DIASTOLIC BLOOD PRESSURE: 72 MMHG | OXYGEN SATURATION: 96 % | DIASTOLIC BLOOD PRESSURE: 63 MMHG

## 2024-03-14 ENCOUNTER — AMBULATORY SURGICAL CENTER (OUTPATIENT)
Dept: URBAN - METROPOLITAN AREA SURGERY 12 | Facility: SURGERY | Age: 69
End: 2024-03-14
Payer: MEDICARE

## 2024-03-14 ENCOUNTER — OFFICE (OUTPATIENT)
Dept: URBAN - METROPOLITAN AREA PATHOLOGY 2 | Facility: PATHOLOGY | Age: 69
End: 2024-03-14
Payer: MEDICARE

## 2024-03-14 DIAGNOSIS — Z86.010 PERSONAL HISTORY OF COLONIC POLYPS: ICD-10-CM

## 2024-03-14 DIAGNOSIS — K64.8 OTHER HEMORRHOIDS: ICD-10-CM

## 2024-03-14 DIAGNOSIS — K63.5 POLYP OF COLON: ICD-10-CM

## 2024-03-14 DIAGNOSIS — Z09 ENCOUNTER FOR FOLLOW-UP EXAMINATION AFTER COMPLETED TREATMEN: ICD-10-CM

## 2024-03-14 DIAGNOSIS — D12.2 BENIGN NEOPLASM OF ASCENDING COLON: ICD-10-CM

## 2024-03-14 DIAGNOSIS — K57.30 DIVERTICULOSIS OF LARGE INTESTINE WITHOUT PERFORATION OR ABS: ICD-10-CM

## 2024-03-14 PROCEDURE — 45384 COLONOSCOPY W/LESION REMOVAL: CPT | Mod: 59 | Performed by: INTERNAL MEDICINE

## 2024-03-14 PROCEDURE — 45385 COLONOSCOPY W/LESION REMOVAL: CPT | Performed by: INTERNAL MEDICINE

## 2024-03-14 PROCEDURE — 88305 TISSUE EXAM BY PATHOLOGIST: CPT | Mod: TC | Performed by: PATHOLOGY

## 2024-04-16 DIAGNOSIS — E11.65 TYPE 2 DIABETES MELLITUS WITH HYPERGLYCEMIA, WITHOUT LONG-TERM CURRENT USE OF INSULIN (MULTI): Primary | ICD-10-CM

## 2024-04-30 ENCOUNTER — TELEMEDICINE (OUTPATIENT)
Dept: PHARMACY | Facility: HOSPITAL | Age: 69
End: 2024-04-30
Payer: MEDICARE

## 2024-04-30 DIAGNOSIS — E11.65 TYPE 2 DIABETES MELLITUS WITH HYPERGLYCEMIA, WITHOUT LONG-TERM CURRENT USE OF INSULIN (MULTI): ICD-10-CM

## 2024-04-30 PROCEDURE — RXMED WILLOW AMBULATORY MEDICATION CHARGE

## 2024-04-30 NOTE — PROGRESS NOTES
Patient is sent at the request of Leighann Thomas DO for my opinion regarding Type 2 diabetes.  My final recommendations will be communicated back to the requesting provider by way of shared medical record.    Subjective     Maria Esther Schuster is a 68 y.o. year old female patient with type two  diabetes mellitus, class II obesity, hypertension, statin myopathy/statin induced myositis, hyperlipidemia and MDD referred for diabetes medication management. Today we are following up to continue her enrollment in the [facility] patient assistance program.         Past Medical History:  She has a past medical history of Angina pectoris (CMS-HCC) (05/22/2023), Muscle pain (05/22/2023), Personal history of other specified conditions, and Urinary tract infection, site not specified (01/25/2021).    Past Surgical History:  She has a past surgical history that includes Other surgical history (02/06/2019); Other surgical history (02/06/2019); Other surgical history (05/11/2022); Other surgical history (05/11/2022); Other surgical history (03/10/2022); Other surgical history (03/10/2022); and CT angio coronary art with heartflow if score >30% (4/12/2022).    Social History:  She reports that she has never smoked. She has never been exposed to tobacco smoke. She has never used smokeless tobacco. She reports that she does not drink alcohol and does not use drugs.    Family History:  Family History   Problem Relation Name Age of Onset    Heart attack Mother      Other (Coronary artery bypass surgery) Father      Diabetes type II Father          With complication, with long term current use of insulin     Allergies:  Patient has no known allergies.    Exercising after breakfast on the treadmill     Current monitoring regimen:   Patient is using: glucometer    Glucose Monitoring: FBG     Patient did not monitor glucose while in [place]     7 Day average: 161 mg/dL  14 Day average: 154 mg/dL  30 day average: 151 mg/dL  90 day  average: 149 mg/dL    4/28/2024:   2 pm: 98 mg/dL (Before Meal)    4/29/2024  9:30 am on  189 mg/dL   3 pm on 4/29/2024 200 mg/dL     4/30/2024:   93 mg/dL at 10 am   1 hour PPBG ham sandwich, potato salad and a piece of pie - 216 mg/dL     Any episodes of hypoglycemia? no    Adverse Effects:   No reported ADRs during encounter    Objective     Last Recorded Vitals:  BP Readings from Last 6 Encounters:   02/14/24 130/68   11/14/23 120/80   11/13/23 126/79   08/15/23 116/50   06/02/23 126/57   05/22/23 121/68     Wt Readings from Last 6 Encounters:   02/14/24 85.5 kg (188 lb 9.6 oz)   11/14/23 86.2 kg (190 lb)   11/13/23 85.7 kg (189 lb)   08/15/23 88 kg (194 lb)   06/02/23 88.5 kg (195 lb)   05/22/23 88.9 kg (196 lb)     Diabetes Pharmacotherapy:    Ozempic 2 mg: Inject 2 mg under the skin once weekly    Glipizide 5 mg: Take 1 tablet (5 mg) by mouth once daily. 30 minutes before breakfast. If you skip breakfast then take one tablet by mouth 30 minutes before lunch.      Primary Prevention:   - Statin? No  - ACE-I/ARB? Yes  - Aspirin? No    Pertinent PMH Review:  - PMH of Pancreatitis: No  - PMH of Retinopathy: No  - PMH of Urinary Tract Infections: Yes   - PMH or FH of MEN Type II/MTC: No    Lab Review  Lab Results   Component Value Date    BILITOT 0.6 02/06/2024    CALCIUM 9.4 02/06/2024    CO2 27 02/06/2024     02/06/2024    CREATININE 0.84 02/06/2024    GLUCOSE 153 (H) 02/06/2024    ALKPHOS 74 02/06/2024    K 4.5 02/06/2024    PROT 6.5 02/06/2024     02/06/2024    AST 17 02/06/2024    ALT 15 02/06/2024    BUN 15 02/06/2024    ANIONGAP 15 02/06/2024    ALBUMIN 4.2 02/06/2024    GFRF >90 05/17/2023     Lab Results   Component Value Date    TRIG 168 (H) 10/23/2023    CHOL 223 (H) 10/23/2023    LDLCALC 138 (H) 10/23/2023    HDL 51.3 10/23/2023     Lab Results   Component Value Date    HGBA1C 7.4 (A) 02/14/2024    HGBA1C 6.9 (H) 10/23/2023    HGBA1C 7.2 (A) 08/15/2023     Component      Latest Ref Rng  "11/2/2023   Albumin, Urine Random      Not established mg/L 7.0    Creatinine, Urine Random      20.0 - 320.0 mg/dL 135.1    Albumin/Creatine Ratio      <30.0 ug/mg Creat 5.2      No components found for: \"UACR\"  The 10-year ASCVD risk score (Jose Alfredo GARDNER, et al., 2019) is: 17.1%    Values used to calculate the score:      Age: 68 years      Sex: Female      Is Non- : No      Diabetic: Yes      Tobacco smoker: No      Systolic Blood Pressure: 130 mmHg      Is BP treated: Yes      HDL Cholesterol: 51.3 mg/dL      Total Cholesterol: 223 mg/dL    Health Maintenance:   Lipid Panel: 138 mg/dL on 10/23/2023  Urine Albumin: 11/2/2023 - WNL     Drug Interactions:  Glipizide + Ozempic: Glucagon-Like Peptide-1 Agonists may enhance the hypoglycemic effect of Sulfonylureas    Assessment/Plan   Problem List Items Addressed This Visit       Type 2 diabetes mellitus with hyperglycemia, without long-term current use of insulin (Multi)    Relevant Orders    Follow Up In [facility] Pharmacy     Other Visit Diagnoses       BMI 38.0-38.9,adult        Relevant Orders    Follow Up In [facility]acy          Patients diabetes is  above goal with   with most recent A1c of 7.4% on 2/14/2024 increased from previously controlled A1c of 6.9% (Goal < 7%). Today we discussed continuation of therapy with Glipizide 5 mg and Ozempic 2 mg once weekly. Future consideration for Mounjaro was discussed for additional weight loss and glycemic control. Due to limited supply of Mounjaro therapy this change was delayed. Advised to continue with Glipizide 5 mg, however I recommended to administer 30 minutes before lunch versus breakfast. Patient is notably exercising consistently after breakfast.   Continue:   Ozempic 2 mg: Inject 2 mg under the skin once weekly   Glipizide 5 mg: Take 1 tablet (5 mg) by mouth once daily. 30 minutes before breakfast. If you skip breakfast then take one tablet by mouth 30 " minutes before lunch.   Labs: Repeat CMP, lipid panel, A1c  Patient notably statin intolerant - future consideration could include use of PCSK9i, ezetimibe, nexletol or nexlizet.   Compliance at present is estimated to be excellent. Efforts to improve compliance (if necessary) will be directed at regular blood sugar monitorin times daily and glipizide administration 30 minutes before lunch .  Education Provided to Patient:    Advised to utilize glipidize 5 mg prior to lunch time. Patient is currently administering therapy approximately 1 hour before lunch.   Follow-up: I recommend diabetes care be 3 months.  PCP Follow-Up: 2024    Chris Maurer, PharmD    Continue all meds under the continuation of care with the referring provider and clinical pharmacy team.

## 2024-05-02 ENCOUNTER — PHARMACY VISIT (OUTPATIENT)
Dept: PHARMACY | Facility: CLINIC | Age: 69
End: 2024-05-02
Payer: MEDICARE

## 2024-05-07 ENCOUNTER — TELEPHONE (OUTPATIENT)
Dept: PRIMARY CARE | Facility: CLINIC | Age: 69
End: 2024-05-07

## 2024-05-07 ENCOUNTER — LAB (OUTPATIENT)
Dept: LAB | Facility: LAB | Age: 69
End: 2024-05-07
Payer: MEDICARE

## 2024-05-07 DIAGNOSIS — I25.10 CORONARY ARTERY DISEASE DUE TO LIPID RICH PLAQUE: ICD-10-CM

## 2024-05-07 DIAGNOSIS — I25.83 CORONARY ARTERY DISEASE DUE TO LIPID RICH PLAQUE: ICD-10-CM

## 2024-05-07 DIAGNOSIS — E06.3 HASHIMOTO'S THYROIDITIS: ICD-10-CM

## 2024-05-07 DIAGNOSIS — R30.0 BURNING WITH URINATION: ICD-10-CM

## 2024-05-07 DIAGNOSIS — E11.65 TYPE 2 DIABETES MELLITUS WITH HYPERGLYCEMIA, WITHOUT LONG-TERM CURRENT USE OF INSULIN (MULTI): ICD-10-CM

## 2024-05-07 LAB
ALBUMIN SERPL BCP-MCNC: 4.4 G/DL (ref 3.4–5)
ALP SERPL-CCNC: 74 U/L (ref 33–136)
ALT SERPL W P-5'-P-CCNC: 16 U/L (ref 7–45)
ANION GAP SERPL CALC-SCNC: 13 MMOL/L (ref 10–20)
APPEARANCE UR: ABNORMAL
AST SERPL W P-5'-P-CCNC: 19 U/L (ref 9–39)
BACTERIA #/AREA URNS AUTO: ABNORMAL /HPF
BILIRUB SERPL-MCNC: 0.6 MG/DL (ref 0–1.2)
BILIRUB UR STRIP.AUTO-MCNC: NEGATIVE MG/DL
BUN SERPL-MCNC: 20 MG/DL (ref 6–23)
CALCIUM SERPL-MCNC: 9.8 MG/DL (ref 8.6–10.6)
CHLORIDE SERPL-SCNC: 101 MMOL/L (ref 98–107)
CHOLEST SERPL-MCNC: 203 MG/DL (ref 0–199)
CHOLESTEROL/HDL RATIO: 3.7
CO2 SERPL-SCNC: 29 MMOL/L (ref 21–32)
COLOR UR: COLORLESS
CREAT SERPL-MCNC: 0.73 MG/DL (ref 0.5–1.05)
EGFRCR SERPLBLD CKD-EPI 2021: >90 ML/MIN/1.73M*2
EST. AVERAGE GLUCOSE BLD GHB EST-MCNC: 146 MG/DL
GLUCOSE SERPL-MCNC: 89 MG/DL (ref 74–99)
GLUCOSE UR STRIP.AUTO-MCNC: NORMAL MG/DL
HBA1C MFR BLD: 6.7 %
HDLC SERPL-MCNC: 54.2 MG/DL
KETONES UR STRIP.AUTO-MCNC: NEGATIVE MG/DL
LDLC SERPL CALC-MCNC: 122 MG/DL
LEUKOCYTE ESTERASE UR QL STRIP.AUTO: ABNORMAL
NITRITE UR QL STRIP.AUTO: NEGATIVE
NON HDL CHOLESTEROL: 149 MG/DL (ref 0–149)
PH UR STRIP.AUTO: 5.5 [PH]
POTASSIUM SERPL-SCNC: 4.4 MMOL/L (ref 3.5–5.3)
PROT SERPL-MCNC: 6.7 G/DL (ref 6.4–8.2)
PROT UR STRIP.AUTO-MCNC: NEGATIVE MG/DL
RBC # UR STRIP.AUTO: ABNORMAL /UL
RBC #/AREA URNS AUTO: ABNORMAL /HPF
SODIUM SERPL-SCNC: 139 MMOL/L (ref 136–145)
SP GR UR STRIP.AUTO: 1
T4 FREE SERPL-MCNC: 1.09 NG/DL (ref 0.78–1.48)
TRIGL SERPL-MCNC: 136 MG/DL (ref 0–149)
TSH SERPL-ACNC: 3.94 MIU/L (ref 0.44–3.98)
UROBILINOGEN UR STRIP.AUTO-MCNC: NORMAL MG/DL
VLDL: 27 MG/DL (ref 0–40)
WBC #/AREA URNS AUTO: >50 /HPF
WBC CLUMPS #/AREA URNS AUTO: ABNORMAL /HPF

## 2024-05-07 PROCEDURE — 87186 SC STD MICRODIL/AGAR DIL: CPT

## 2024-05-07 PROCEDURE — 80061 LIPID PANEL: CPT

## 2024-05-07 PROCEDURE — 80053 COMPREHEN METABOLIC PANEL: CPT

## 2024-05-07 PROCEDURE — 81001 URINALYSIS AUTO W/SCOPE: CPT

## 2024-05-07 PROCEDURE — 84443 ASSAY THYROID STIM HORMONE: CPT

## 2024-05-07 PROCEDURE — 83036 HEMOGLOBIN GLYCOSYLATED A1C: CPT

## 2024-05-07 PROCEDURE — 87086 URINE CULTURE/COLONY COUNT: CPT

## 2024-05-07 PROCEDURE — 84439 ASSAY OF FREE THYROXINE: CPT

## 2024-05-07 PROCEDURE — 36415 COLL VENOUS BLD VENIPUNCTURE: CPT

## 2024-05-07 NOTE — TELEPHONE ENCOUNTER
Patient calling to see if urine culture results are available yet. Let patient know it was still in progress. Office will call when results are in.

## 2024-05-07 NOTE — TELEPHONE ENCOUNTER
PATIENT EXPIERIENCING BURNING WITH URINATION, FREQUENCY, AND URGENCY. ORDERS PLACED FOR PATIENT TO PROVIDE URINE SAMPLE AT THE LAB.

## 2024-05-07 NOTE — TELEPHONE ENCOUNTER
Pt just stopped into office and believes she has a UTI, in lobby now. For the last few days she has been experiencing burning, frequency with little to no urine output, pain, very tired and it a lot of pain. Would like to give a UA.  Pt # [phone]

## 2024-05-08 ENCOUNTER — TELEPHONE (OUTPATIENT)
Dept: PRIMARY CARE | Facility: CLINIC | Age: 69
End: 2024-05-08
Payer: MEDICARE

## 2024-05-08 DIAGNOSIS — N39.0 ACUTE UTI: Primary | ICD-10-CM

## 2024-05-08 RX ORDER — CIPROFLOXACIN 500 MG/1
500 TABLET ORAL 2 TIMES DAILY
Qty: 10 TABLET | Refills: 0 | Status: SHIPPED | OUTPATIENT
Start: 2024-05-08 | End: 2024-05-13

## 2024-05-08 NOTE — TELEPHONE ENCOUNTER
Patient calling,is in extreme discomfort, saw results in her chart, can something be called in today, she cannot take this issue anymore. Does show abnormal. Can she be called.  [phone]

## 2024-05-09 LAB — BACTERIA UR CULT: ABNORMAL

## 2024-05-16 ENCOUNTER — OFFICE VISIT (OUTPATIENT)
Dept: PRIMARY CARE | Facility: CLINIC | Age: 69
End: 2024-05-16
Payer: MEDICARE

## 2024-05-16 VITALS
BODY MASS INDEX: 38.72 KG/M2 | HEART RATE: 72 BPM | WEIGHT: 197.2 LBS | RESPIRATION RATE: 16 BRPM | HEIGHT: 60 IN | SYSTOLIC BLOOD PRESSURE: 130 MMHG | DIASTOLIC BLOOD PRESSURE: 65 MMHG

## 2024-05-16 DIAGNOSIS — I10 BENIGN ESSENTIAL HYPERTENSION: ICD-10-CM

## 2024-05-16 DIAGNOSIS — E11.65 TYPE 2 DIABETES MELLITUS WITH HYPERGLYCEMIA, WITHOUT LONG-TERM CURRENT USE OF INSULIN (MULTI): ICD-10-CM

## 2024-05-16 DIAGNOSIS — I25.10 CORONARY ARTERY DISEASE DUE TO LIPID RICH PLAQUE: Primary | ICD-10-CM

## 2024-05-16 DIAGNOSIS — E03.8 HYPOTHYROIDISM DUE TO HASHIMOTO'S THYROIDITIS: ICD-10-CM

## 2024-05-16 DIAGNOSIS — I25.83 CORONARY ARTERY DISEASE DUE TO LIPID RICH PLAQUE: Primary | ICD-10-CM

## 2024-05-16 DIAGNOSIS — E78.5 HYPERLIPIDEMIA, UNSPECIFIED HYPERLIPIDEMIA TYPE: ICD-10-CM

## 2024-05-16 DIAGNOSIS — E06.3 HYPOTHYROIDISM DUE TO HASHIMOTO'S THYROIDITIS: ICD-10-CM

## 2024-05-16 DIAGNOSIS — E06.3 HASHIMOTO'S THYROIDITIS: ICD-10-CM

## 2024-05-16 PROCEDURE — 3075F SYST BP GE 130 - 139MM HG: CPT | Performed by: INTERNAL MEDICINE

## 2024-05-16 PROCEDURE — 4010F ACE/ARB THERAPY RXD/TAKEN: CPT | Performed by: INTERNAL MEDICINE

## 2024-05-16 PROCEDURE — 99214 OFFICE O/P EST MOD 30 MIN: CPT | Performed by: INTERNAL MEDICINE

## 2024-05-16 PROCEDURE — 3008F BODY MASS INDEX DOCD: CPT | Performed by: INTERNAL MEDICINE

## 2024-05-16 PROCEDURE — 3049F LDL-C 100-129 MG/DL: CPT | Performed by: INTERNAL MEDICINE

## 2024-05-16 PROCEDURE — 1126F AMNT PAIN NOTED NONE PRSNT: CPT | Performed by: INTERNAL MEDICINE

## 2024-05-16 PROCEDURE — 3044F HG A1C LEVEL LT 7.0%: CPT | Performed by: INTERNAL MEDICINE

## 2024-05-16 PROCEDURE — 1159F MED LIST DOCD IN RCRD: CPT | Performed by: INTERNAL MEDICINE

## 2024-05-16 PROCEDURE — 3078F DIAST BP <80 MM HG: CPT | Performed by: INTERNAL MEDICINE

## 2024-05-16 RX ORDER — LEVOTHYROXINE SODIUM 25 UG/1
25 TABLET ORAL
Qty: 90 TABLET | Refills: 3 | Status: SHIPPED | OUTPATIENT
Start: 2024-05-16

## 2024-05-16 RX ORDER — LISINOPRIL 20 MG/1
20 TABLET ORAL DAILY
Qty: 90 TABLET | Refills: 3 | Status: SHIPPED | OUTPATIENT
Start: 2024-05-16

## 2024-05-16 RX ORDER — GLIPIZIDE 5 MG/1
5 TABLET ORAL DAILY
Qty: 90 TABLET | Refills: 3 | Status: SHIPPED | OUTPATIENT
Start: 2024-05-16

## 2024-05-16 ASSESSMENT — PATIENT HEALTH QUESTIONNAIRE - PHQ9
2. FEELING DOWN, DEPRESSED OR HOPELESS: NOT AT ALL
SUM OF ALL RESPONSES TO PHQ9 QUESTIONS 1 AND 2: 0
1. LITTLE INTEREST OR PLEASURE IN DOING THINGS: NOT AT ALL

## 2024-05-16 ASSESSMENT — PAIN SCALES - GENERAL: PAINLEVEL: 0-NO PAIN

## 2024-05-16 NOTE — PATIENT INSTRUCTIONS
See me again in August or Sept for your Medicare Wellness Visit.    We can do a fingerstick A1c  at your next visit.

## 2024-05-16 NOTE — PROGRESS NOTES
"Subjective   Maria Esther Schuster is a 68 y.o. female who presents for Follow-up.    Her UTI resolved     She went on vacation to [place] and her  had a surprise BD party    She has been checking her sugars once daily  She has been trying to take it 2 hours after eating and they are in \"low 200\"  Her Aic from 5/7 was 6.7 which is her lowest ever !  She is on ozempic 2 mg/week , glipizide   She is exercising or working in her garden every day    She has gained weight but she blames it on her vacation.     No sob  No chest pain  More energy    Review of Systems    Objective   /65   Pulse 72   Resp 16   Ht 1.524 m (5')   Wt 89.4 kg (197 lb 3.2 oz)   BMI 38.51 kg/m²    Physical Exam       GEN: NAD  HEENT: normal  NECK: no adenopathy, no thyroid enlargment  LUNGS: CTAB  CV: reg S1/S2 no murmurs  EXT: no leg edema   Assessment/Plan   Problem List Items Addressed This Visit       Benign essential hypertension she is taking 20 mg lisinopri.    Relevant Medications    lisinopril 20 mg tablet    Coronary artery disease due to lipid rich plaque - lipids are pretty good despite no statin     Type 2 diabetes mellitus with hyperglycemia, without long-term current use of insulin (Multi)  She checks her sugar once daily usually after fasting . Aic was 6.7.      Relevant Medications    lisinopril 20 mg tablet    glipiZIDE (Glucotrol) 5 mg tablet    Hyperlipidemia she is unable to tolerate any statins or Repatha.  Lipids are stable.     Hashimoto's thyroiditis    Relevant Medications    levothyroxine (Synthroid) 25 mcg tablet    See me in August or Sept for  wellness and can do fingerstick A1c at that visit.           "

## 2024-05-29 NOTE — PROGRESS NOTES
Maria Esther Schuster female   1955 68 y.o.   [id number]      Chief Complaint    Follow-up          HPI  Maria Esther Schuster is a 68 y.o.  female [facility]. She as been previous followed with my colleague Dr Camryn Camacho. 2023 right ultrasound core biopsy 2:00 2cm from nipple noted apocrine metaplasia, cysts and microscopic intraductal papilloma with usual ductal hyperplasia, concordant. She is being followed short term for right breast 2:00 5cm from nipple probable benign simple cyst warranting 6 month follow up diagnostic mammogram and ultrasound.     BREAST IMAGING: To be performed today    REPRODUCTIVE HISTORY: menarche age 15, , first birth age 20, no OCPs/HRT. Menopause age 47,     FAMILY CANCER HISTORY: None    REVIEW OF SYSTEMS    Constitutional:  Negative for appetite change, fatigue, fever and unexpected weight change.   HENT:  Negative for ear pain, hearing loss, nosebleeds, sore throat and trouble swallowing.    Eyes:  Negative for discharge, itching and visual disturbance.   Respiratory:  Negative for cough, chest tightness and shortness of breath.    Cardiovascular:  Negative for chest pain, palpitations and leg swelling.   Breast: as indicated in HPI  Gastrointestinal:  Negative for abdominal pain, constipation, diarrhea and nausea.   Endocrine: Negative for cold intolerance and heat intolerance.   Genitourinary:  Negative for dysuria, frequency, hematuria, pelvic pain and vaginal bleeding.   Musculoskeletal:  Negative for arthralgias, back pain, gait problem, joint swelling and myalgias.   Skin:  Negative for color change and rash.   Allergic/Immunologic: Negative for environmental allergies and food allergies.   Neurological:  Negative for dizziness, tremors, speech difficulty, weakness, numbness and headaches.   Hematological:  Does not bruise/bleed easily.   Psychiatric/Behavioral:  Negative for agitation, dysphoric mood and sleep disturbance.  The patient is not nervous/anxious.         MEDICATIONS  Current Outpatient Medications   Medication Instructions    aspirin 81 mg EC tablet 1 tablet, oral, Daily    blood sugar diagnostic (OneTouch Verio test strips) strip check sugar three times a day, in morning, before dinner and before bedtime    citalopram (CELEXA) 20 mg, oral, Daily    glipiZIDE (GLUCOTROL) 5 mg, oral, Daily, 30 minutes before breakfast. If you skip breakfast then take one tablet by mouth 30 minutes before lunch.    hydrocortisone 2.5 % cream APPLY GENEROUSLY TO AFFECTED AREAS BEFORE AND AFTER SWIMMING.    levothyroxine (SYNTHROID) 25 mcg, oral, Daily before breakfast    lisinopril 20 mg, oral, Daily    meclizine (ANTIVERT) 25 mg, oral, 3 times daily PRN    meloxicam (MOBIC) 7.5 mg, oral, Daily    metoprolol succinate XL (TOPROL-XL) 25 mg, oral, Daily    semaglutide 2 mg/dose (8 mg/3 mL) pen injector INJECT 2 MG UNDER THE SKIN 1 (ONE) TIME PER WEEK.        ALLERGIES  No Known Allergies     SOCIAL HISTORY    Family History   Problem Relation Name Age of Onset    Heart attack Mother      Other (Coronary artery bypass surgery) Father      Diabetes type II Father          With complication, with long term current use of insulin         Social History     Tobacco Use    Smoking status: Never     Passive exposure: Never    Smokeless tobacco: Never   Substance Use Topics    Alcohol use: Never        VITALS  Vitals:    06/03/24 1317   BP: 151/80   Pulse: 66        PHYSICAL EXAM  Patient is alert and oriented x3, with appropriate mood. The gait is steady and hand grasps are equal. Sclera clear. The breasts are nearly symmetrical. The tissue is soft without palpable abnormalities, discrete nodules or masses. The skin and nipples appear normal. There is no cervical, supraclavicular, or axillary lymphadenopathy palpable. Heart rate and rhythm normal, S1 and S2 appreciated. The lungs are clear bilaterally. Abdomen is soft & non-tender.    Physical Exam      IMAGING  BI US breast limited right 06/03/2024  BI mammo right diagnostic tomosynthesis 06/03/2024    Narrative  Interpreted By:  Guero Taylor and Marshall Colin  STUDY:  BI MAMMO RIGHT DIAGNOSTIC TOMOSYNTHESIS; BI US BREAST LIMITED RIGHT;  6/3/2024 12:26 pm; 6/3/2024 1:15 pm    INDICATION:  Six-month follow-up probably benign right breast mass 2 o'clock 5 cm  from the nipple.    COMPARISON:  Mammogram dated 10/17/2023, 10/09/2023, 10/03/2022 and 12/28/2020.  Ultrasound dated 11/13/2023.    FINDINGS:  MAMMOGRAPHY: 2D and tomosynthesis images were reviewed at 1 mm slice  thickness.    Density:  There are areas of scattered fibroglandular tissue.    A biopsy clip is visualized within the superior medial right breast  at anterior depth. A stable circumscribed equal density mass is again  seen in the superior medial right breast at middle depth. No new  suspicious masses or calcifications are identified.    ULTRASOUND: Targeted ultrasound was performed of the right breast by  a registered sonographer with elastography.    At 2 o'clock 5 cm from the nipple, there slight interval decrease in  size of the anechoic round parallel mass which currently measures 0.3  x 0.2 x 0.2 cm, previously measuring 0.3 x 0.3 x 0.3 cm. This  demonstrates no internal vascularity on Doppler and is soft on  elastography. Findings correspond to the mammographic mass.    Impression  Benign right breast simple cyst corresponding to the previously  described probably benign mass. Recommend return to screening  mammography.    BI-RADS Category:  1 Negative.  Recommendation:  Annual Screening.  Recommended Date:  4 Months.  Laterality:  Bilateral.    Time was spent viewing digital images of the radiology testing with the patient. I explained the results in depth, along with suggested explanation for follow up recommendations based on the testing results.        ORDERS  Orders Placed This Encounter   Procedures    BI mammo bilateral  screening tomosynthesis     Standing Status:   Future     Standing Expiration Date:   8/3/2025     Order Specific Question:   Perform a breast ultrasound if clinically indicated by Radiologist?     Answer:   Yes     Order Specific Question:   Previous Mamm performed at  location?     Answer:   Yes     Order Specific Question:   Reason for exam:     Answer:   screening, hx right benign breast biopsy     Order Specific Question:   Radiologist to Determine Optimal Study     Answer:   Yes     Order Specific Question:   Release result to [facility]     Answer:   Immediate     Order Specific Question:   Is this exam part of a Research Study? If Yes, link this order to the research study     Answer:   No         ASSESSMENT/PLAN  1. Healthcare maintenance  BI mammo bilateral screening tomosynthesis      2. Mass of right breast, unspecified quadrant             Follow up return to PCP, screening mammogram due October 2024.      Donna Viveros, [facility]

## 2024-06-03 ENCOUNTER — HOSPITAL ENCOUNTER (OUTPATIENT)
Dept: RADIOLOGY | Facility: CLINIC | Age: 69
Discharge: HOME | End: 2024-06-03
Payer: MEDICARE

## 2024-06-03 ENCOUNTER — OFFICE VISIT (OUTPATIENT)
Dept: SURGICAL ONCOLOGY | Facility: CLINIC | Age: 69
End: 2024-06-03
Payer: MEDICARE

## 2024-06-03 VITALS
HEART RATE: 66 BPM | BODY MASS INDEX: 38.36 KG/M2 | DIASTOLIC BLOOD PRESSURE: 80 MMHG | SYSTOLIC BLOOD PRESSURE: 151 MMHG | WEIGHT: 195.4 LBS | HEIGHT: 60 IN

## 2024-06-03 DIAGNOSIS — R92.8 ABNORMAL FINDING ON BREAST IMAGING: ICD-10-CM

## 2024-06-03 DIAGNOSIS — Z00.00 HEALTHCARE MAINTENANCE: Primary | ICD-10-CM

## 2024-06-03 DIAGNOSIS — N63.10 MASS OF RIGHT BREAST, UNSPECIFIED QUADRANT: ICD-10-CM

## 2024-06-03 PROCEDURE — 3077F SYST BP >= 140 MM HG: CPT | Performed by: NURSE PRACTITIONER

## 2024-06-03 PROCEDURE — 99213 OFFICE O/P EST LOW 20 MIN: CPT | Performed by: NURSE PRACTITIONER

## 2024-06-03 PROCEDURE — 3079F DIAST BP 80-89 MM HG: CPT | Performed by: NURSE PRACTITIONER

## 2024-06-03 PROCEDURE — 76642 ULTRASOUND BREAST LIMITED: CPT | Mod: RT

## 2024-06-03 PROCEDURE — 3049F LDL-C 100-129 MG/DL: CPT | Performed by: NURSE PRACTITIONER

## 2024-06-03 PROCEDURE — 1159F MED LIST DOCD IN RCRD: CPT | Performed by: NURSE PRACTITIONER

## 2024-06-03 PROCEDURE — 3044F HG A1C LEVEL LT 7.0%: CPT | Performed by: NURSE PRACTITIONER

## 2024-06-03 PROCEDURE — 3008F BODY MASS INDEX DOCD: CPT | Performed by: NURSE PRACTITIONER

## 2024-06-03 PROCEDURE — 77061 BREAST TOMOSYNTHESIS UNI: CPT | Mod: RT

## 2024-06-03 PROCEDURE — 1126F AMNT PAIN NOTED NONE PRSNT: CPT | Performed by: NURSE PRACTITIONER

## 2024-06-03 PROCEDURE — 1160F RVW MEDS BY RX/DR IN RCRD: CPT | Performed by: NURSE PRACTITIONER

## 2024-06-03 PROCEDURE — 76642 ULTRASOUND BREAST LIMITED: CPT | Mod: RIGHT SIDE | Performed by: STUDENT IN AN ORGANIZED HEALTH CARE EDUCATION/TRAINING PROGRAM

## 2024-06-03 PROCEDURE — G0279 TOMOSYNTHESIS, MAMMO: HCPCS | Mod: RIGHT SIDE | Performed by: STUDENT IN AN ORGANIZED HEALTH CARE EDUCATION/TRAINING PROGRAM

## 2024-06-03 PROCEDURE — 77065 DX MAMMO INCL CAD UNI: CPT | Mod: RIGHT SIDE | Performed by: STUDENT IN AN ORGANIZED HEALTH CARE EDUCATION/TRAINING PROGRAM

## 2024-06-03 PROCEDURE — 76982 USE 1ST TARGET LESION: CPT | Mod: RT

## 2024-06-03 PROCEDURE — 4010F ACE/ARB THERAPY RXD/TAKEN: CPT | Performed by: NURSE PRACTITIONER

## 2024-06-03 PROCEDURE — 1036F TOBACCO NON-USER: CPT | Performed by: NURSE PRACTITIONER

## 2024-06-03 ASSESSMENT — PAIN SCALES - GENERAL: PAINLEVEL: 0-NO PAIN

## 2024-06-13 PROBLEM — K59.09 INTERMITTENT CONSTIPATION: Status: ACTIVE | Noted: 2024-06-13

## 2024-06-13 PROBLEM — K57.30 DIVERTICULOSIS OF LARGE INTESTINE WITHOUT PERFORATION OR ABSCESS WITHOUT BLEEDING: Status: ACTIVE | Noted: 2024-03-14

## 2024-06-13 PROBLEM — E78.5 HYPERLIPIDEMIA: Chronic | Status: ACTIVE | Noted: 2023-11-10

## 2024-06-13 PROBLEM — K63.5 POLYP OF COLON: Status: ACTIVE | Noted: 2024-03-14

## 2024-06-13 PROBLEM — I25.10 CORONARY ARTERY DISEASE: Chronic | Status: ACTIVE | Noted: 2023-05-22

## 2024-06-13 PROBLEM — R30.0 BURNING WITH URINATION: Status: RESOLVED | Noted: 2023-11-10 | Resolved: 2024-06-13

## 2024-06-13 PROBLEM — R05.9 COUGH: Status: RESOLVED | Noted: 2023-11-10 | Resolved: 2024-06-13

## 2024-06-24 ENCOUNTER — TELEPHONE (OUTPATIENT)
Dept: PRIMARY CARE | Facility: CLINIC | Age: 69
End: 2024-06-24
Payer: MEDICARE

## 2024-06-24 NOTE — TELEPHONE ENCOUNTER
Argelia with [facility] calling stating that with the pts age and her being diabetic they are suggesting she be on a Statin.

## 2024-06-26 PROBLEM — I10 BENIGN ESSENTIAL HYPERTENSION: Chronic | Status: ACTIVE | Noted: 2023-05-22

## 2024-07-08 ENCOUNTER — TELEPHONE (OUTPATIENT)
Dept: PRIMARY CARE | Facility: CLINIC | Age: 69
End: 2024-07-08
Payer: MEDICARE

## 2024-07-08 NOTE — TELEPHONE ENCOUNTER
Patient calling,positive for covid. Her oldest son passed away, [place] was at [place]believes she caught it there. Now has covid. Her  is positive as well. Should she take paxlovid for this. Wants doctors opinion.  [phone]

## 2024-07-09 NOTE — TELEPHONE ENCOUNTER
I spoke with Maria Esther. She is starting to feel better just has a cough right now. She is wanting to hold off on taking the Paxlovid for now.

## 2024-07-10 ENCOUNTER — TELEPHONE (OUTPATIENT)
Dept: CARDIOLOGY | Facility: CLINIC | Age: 69
End: 2024-07-10
Payer: MEDICARE

## 2024-07-10 NOTE — TELEPHONE ENCOUNTER
Maria Esther called and said she is concerned her BP has been a little to high lately. She is under a lot of stress due to losing her son. Please call.

## 2024-07-11 NOTE — TELEPHONE ENCOUNTER
"Spoke with patient-Son passed away 6/26 sudden cardiac arrest, MI. Then she had COVID.    Stated her bp was 158/65 when she called. Last night 131/65. \"Doing  better\". Thinks it was just a bad day d/t grief and illness. Much reassurance provided and advised she can contact office for any questions/concerns.    Advised of need for yearly appt and patient agreeable, scheduled on 7/22 3:45 at [place].  "

## 2024-07-21 PROBLEM — K59.09 INTERMITTENT CONSTIPATION: Status: RESOLVED | Noted: 2024-06-13 | Resolved: 2024-07-21

## 2024-07-21 PROBLEM — Z00.00 HEALTHCARE MAINTENANCE: Status: RESOLVED | Noted: 2023-08-29 | Resolved: 2024-07-21

## 2024-07-21 PROBLEM — J01.90 ACUTE SINUSITIS: Status: RESOLVED | Noted: 2023-11-10 | Resolved: 2024-07-21

## 2024-07-21 PROBLEM — J22 LOWER RESPIRATORY INFECTION: Status: RESOLVED | Noted: 2023-11-10 | Resolved: 2024-07-21

## 2024-07-21 PROBLEM — R30.0 DYSURIA: Status: RESOLVED | Noted: 2023-11-10 | Resolved: 2024-07-21

## 2024-07-21 PROBLEM — E11.65 TYPE 2 DIABETES MELLITUS WITH HYPERGLYCEMIA, WITHOUT LONG-TERM CURRENT USE OF INSULIN (MULTI): Chronic | Status: ACTIVE | Noted: 2023-08-29

## 2024-07-21 PROBLEM — M47.812 DEGENERATIVE JOINT DISEASE OF CERVICAL SPINE: Status: RESOLVED | Noted: 2023-11-10 | Resolved: 2024-07-21

## 2024-07-21 PROBLEM — R53.83 FATIGUE: Status: RESOLVED | Noted: 2023-11-10 | Resolved: 2024-07-21

## 2024-07-21 PROBLEM — N39.0 ACUTE UTI: Status: RESOLVED | Noted: 2023-11-10 | Resolved: 2024-07-21

## 2024-07-21 PROBLEM — M54.2 NECK PAIN ON LEFT SIDE: Status: RESOLVED | Noted: 2023-11-10 | Resolved: 2024-07-21

## 2024-07-21 PROBLEM — R42 VERTIGO: Status: RESOLVED | Noted: 2023-05-22 | Resolved: 2024-07-21

## 2024-07-21 PROBLEM — N76.0 ACUTE VAGINITIS: Status: RESOLVED | Noted: 2023-11-10 | Resolved: 2024-07-21

## 2024-07-21 PROBLEM — R50.9 FEVER AND CHILLS: Status: RESOLVED | Noted: 2023-11-10 | Resolved: 2024-07-21

## 2024-07-21 PROBLEM — J20.9 ACUTE BRONCHITIS: Status: RESOLVED | Noted: 2023-11-10 | Resolved: 2024-07-21

## 2024-07-21 NOTE — PROGRESS NOTES
Referred by No ref. provider found    RO [facility] is here for a yearly follow up. Overall a good year until  when her 47 y/o son  of a massive MI.    Past Medical History:  Problem List Items Addressed This Visit    None     Past Medical History:   Diagnosis Date    Angina pectoris (CMS-HCC) 2023    Benign essential hypertension 2023    Coronary artery disease 2023 s/p JENNIFER to RCA and mid LAD      Hyperlipidemia 11/10/2023    Unable to tolerate statins … also unable to tolerate Repatha and Zetia  Patient has chosen not to take anything    Muscle pain 2023    Personal history of other specified conditions     History of dysuria    Urinary tract infection, site not specified 2021    Acute UTI         Past Surgical History:  She has a past surgical history that includes Breast biopsy (Right, 2023); Other surgical history (2019); Other surgical history (2022); Other surgical history (2022); Other surgical history (03/10/2022); Other surgical history (03/10/2022); and CT angio coronary art with heartflow if score >30% (2022).      Social History:  She reports that she has never smoked. She has never been exposed to tobacco smoke. She has never used smokeless tobacco. She reports that she does not drink alcohol and does not use drugs.    Family History:  Family History   Problem Relation Name Age of Onset    Heart attack Mother      Other (Coronary artery bypass surgery) Father      Diabetes type II Father          With complication, with long term current use of insulin     Allergies:  Statins-hmg-coa reductase inhibitors    Outpatient Medications:  Current Outpatient Medications   Medication Instructions    aspirin 81 mg EC tablet 1 tablet, oral, Daily    blood sugar diagnostic (OneTouch Verio test strips) strip check sugar three times a day, in morning, before dinner and before bedtime    citalopram (CELEXA) 20 mg, oral, Daily    glipiZIDE  (GLUCOTROL) 5 mg, oral, Daily, 30 minutes before breakfast. If you skip breakfast then take one tablet by mouth 30 minutes before lunch.    hydrocortisone 2.5 % cream APPLY GENEROUSLY TO AFFECTED AREAS BEFORE AND AFTER SWIMMING.    levothyroxine (SYNTHROID) 25 mcg, oral, Daily before breakfast    lisinopril 20 mg, oral, Daily    meclizine (ANTIVERT) 25 mg, oral, 3 times daily PRN    meloxicam (MOBIC) 7.5 mg, oral, Daily    metoprolol succinate XL (TOPROL-XL) 25 mg, oral, Daily    semaglutide 2 mg/dose (8 mg/3 mL) pen injector INJECT 2 MG UNDER THE SKIN 1 (ONE) TIME PER WEEK.     Last Recorded Vitals:  There were no vitals filed for this visit.    Physical Exam  Patient is alert and oriented x3.  HEENT is unremarkable mucous members are moist  Neck no JVP no bruits upstrokes are full no thyromegaly  Lungs are clear bilaterally.  No wheezing crackles or rales  Heart regular rhythm normal S1-S2 there is no S3 no murmurs are heard.  Abdomen is soft bs are positive nontender nondistended no organomegaly no pulsatile masses  Extremities have no edema.  Distal pulses present palpable.  Neuro is grossly nonfocal  Skin has no rashes     Last Labs:  CBC -  Lab Results   Component Value Date    WBC 9.4 02/06/2024    HGB 13.1 02/06/2024    HCT 40.1 02/06/2024    MCV 93 02/06/2024     02/06/2024     CMP -  Lab Results   Component Value Date    CALCIUM 9.8 05/07/2024    PROT 6.7 05/07/2024    ALBUMIN 4.4 05/07/2024    AST 19 05/07/2024    ALT 16 05/07/2024    ALKPHOS 74 05/07/2024    BILITOT 0.6 05/07/2024     LIPID PANEL -   Lab Results   Component Value Date    CHOL 203 (H) 05/07/2024    HDL 54.2 05/07/2024    CHHDL 3.7 05/07/2024    VLDL 27 05/07/2024    TRIG 136 05/07/2024    NHDL 149 05/07/2024     RENAL FUNCTION PANEL -   Lab Results   Component Value Date    K 4.4 05/07/2024     Lab Results   Component Value Date     (H) 04/18/2022    HGBA1C 6.7 (H) 05/07/2024    HGBA1C 7.4 (A) 02/14/2024         Procedure  PCI [04/20/2022, Dr. Ortiz Olivo]: Successful [facility] JENNIFER of a severe stenosis in the distal RCA and mid LAD.      Left Heart Cath [04/20/2022]: Severe two-vessel coronary disease involving a 95% mid RCA stenosis, and an 85% mid LAD stenosis. There is mild to moderate luminal regularities of the other vessels. Recommendations: At this point, the patient will proceed with intervention on the RCA and LAD. Aggressive risk factor modification be pursued. She does have an elevated left-ventricular end-diastolic pressure. Blood pressure will be more ideally controlled.      CTA w/HEART FLOW [04/12/2022] = 269.56 (.86, LAD 20.77, LCx 48.62, RCA 54.31). Right dominant system. Multi-vessel CAD. >75% lipid rich plaque in mid LAD, 2nd diagonal branch and distal RCA. Recommend invasive eval for potential intervention.   Assessment/Plan     1. CAD. She had a JENNIFER to the RCA and LAD in April 2022. She stopped her Plavix in April 2023 which was appropriate. She will continue with aspirin. She remains active working out at the gym. She is still grieving the loss of her son.      2. Hypertension. Blood pressures are excellent.     3. Hyperlipidemia. He is unable to tolerate rosuvastatin atorvastatin simvastatin and pravastatin. She cannot tolerate Repatha nor does she tolerate Zetia. Her preference is to not try anything else.  5/7/2024  HDL 54 triglycerides 136    EKG today. RTC 1 year.   Case Cooper MD     Instructions and follow up

## 2024-07-22 ENCOUNTER — OFFICE VISIT (OUTPATIENT)
Dept: CARDIOLOGY | Facility: CLINIC | Age: 69
End: 2024-07-22
Payer: MEDICARE

## 2024-07-22 VITALS
HEART RATE: 70 BPM | BODY MASS INDEX: 37.3 KG/M2 | DIASTOLIC BLOOD PRESSURE: 68 MMHG | OXYGEN SATURATION: 97 % | SYSTOLIC BLOOD PRESSURE: 132 MMHG | WEIGHT: 191 LBS

## 2024-07-22 DIAGNOSIS — M60.9 STATIN-INDUCED MYOSITIS: Primary | ICD-10-CM

## 2024-07-22 DIAGNOSIS — I25.10 CORONARY ARTERY DISEASE INVOLVING NATIVE CORONARY ARTERY OF NATIVE HEART WITHOUT ANGINA PECTORIS: Chronic | ICD-10-CM

## 2024-07-22 DIAGNOSIS — E78.2 MIXED HYPERLIPIDEMIA: Chronic | ICD-10-CM

## 2024-07-22 DIAGNOSIS — T46.6X5A STATIN-INDUCED MYOSITIS: Primary | ICD-10-CM

## 2024-07-22 DIAGNOSIS — I10 BENIGN ESSENTIAL HYPERTENSION: Chronic | ICD-10-CM

## 2024-07-22 PROCEDURE — 99214 OFFICE O/P EST MOD 30 MIN: CPT | Performed by: INTERNAL MEDICINE

## 2024-07-22 PROCEDURE — 1036F TOBACCO NON-USER: CPT | Performed by: INTERNAL MEDICINE

## 2024-07-22 PROCEDURE — 93005 ELECTROCARDIOGRAM TRACING: CPT | Performed by: INTERNAL MEDICINE

## 2024-07-22 PROCEDURE — 4010F ACE/ARB THERAPY RXD/TAKEN: CPT | Performed by: INTERNAL MEDICINE

## 2024-07-22 PROCEDURE — 3044F HG A1C LEVEL LT 7.0%: CPT | Performed by: INTERNAL MEDICINE

## 2024-07-22 PROCEDURE — 93010 ELECTROCARDIOGRAM REPORT: CPT | Performed by: STUDENT IN AN ORGANIZED HEALTH CARE EDUCATION/TRAINING PROGRAM

## 2024-07-22 PROCEDURE — 3075F SYST BP GE 130 - 139MM HG: CPT | Performed by: INTERNAL MEDICINE

## 2024-07-22 PROCEDURE — 3078F DIAST BP <80 MM HG: CPT | Performed by: INTERNAL MEDICINE

## 2024-07-22 PROCEDURE — 1159F MED LIST DOCD IN RCRD: CPT | Performed by: INTERNAL MEDICINE

## 2024-07-22 PROCEDURE — 3049F LDL-C 100-129 MG/DL: CPT | Performed by: INTERNAL MEDICINE

## 2024-07-22 PROCEDURE — 1160F RVW MEDS BY RX/DR IN RCRD: CPT | Performed by: INTERNAL MEDICINE

## 2024-07-22 RX ORDER — UBIDECARENONE 30 MG
30 CAPSULE ORAL DAILY
COMMUNITY

## 2024-07-22 NOTE — PATIENT INSTRUCTIONS
1. CAD. She had a JENNIFER to the RCA and LAD in April 2022. She stopped her Plavix in April 2023 which was appropriate. She will continue with aspirin. She remains active working out at the gym. She is still grieving the loss of her son.      2. Hypertension. Blood pressures are excellent.     3. Hyperlipidemia. He is unable to tolerate rosuvastatin atorvastatin simvastatin and pravastatin. She cannot tolerate Repatha nor does she tolerate Zetia. Her preference is to not try anything else.  5/7/2024  HDL 54 triglycerides 136    EKG today. RTC 1 year.

## 2024-07-25 LAB
ATRIAL RATE: 65 BPM
P AXIS: 68 DEGREES
P OFFSET: 195 MS
P ONSET: 145 MS
PR INTERVAL: 138 MS
Q ONSET: 214 MS
QRS COUNT: 11 BEATS
QRS DURATION: 86 MS
QT INTERVAL: 408 MS
QTC CALCULATION(BAZETT): 424 MS
QTC FREDERICIA: 418 MS
R AXIS: 57 DEGREES
T AXIS: 75 DEGREES
T OFFSET: 418 MS
VENTRICULAR RATE: 65 BPM

## 2024-08-01 ENCOUNTER — APPOINTMENT (OUTPATIENT)
Dept: PHARMACY | Facility: HOSPITAL | Age: 69
End: 2024-08-01
Payer: MEDICARE

## 2024-08-01 DIAGNOSIS — E11.65 TYPE 2 DIABETES MELLITUS WITH HYPERGLYCEMIA, WITHOUT LONG-TERM CURRENT USE OF INSULIN (MULTI): ICD-10-CM

## 2024-08-01 PROCEDURE — RXMED WILLOW AMBULATORY MEDICATION CHARGE

## 2024-08-01 NOTE — PROGRESS NOTES
Patient is sent at the request of Leighann Thomas DO for my opinion regarding Type 2 diabetes.  My final recommendations will be communicated back to the requesting provider by way of shared medical record.    Subjective     Maria Esther Schuster is a 68 y.o. year old female patient with type two diabetes mellitus, class II obesity, hypertension, statin myopathy/statin induced myositis, hyperlipidemia and MDD referred for diabetes medication management.      Patient notes she has not been eating as much d/t a death in the family. Notes that she is going to the gym 3 times per week. Endorses weight loss, most recent weight 186 lbs.     Endorses low BG symptoms, notes this occurs when she doesn't eat. After further discussion, patient noted she at times takes glipizide when she is skipping a meal.     Past Medical History:  She has a past medical history of Angina pectoris (CMS-HCC) (05/22/2023), Benign essential hypertension (05/22/2023), Coronary artery disease (05/22/2023), Hyperlipidemia (11/10/2023), Muscle pain (05/22/2023), Personal history of other specified conditions, Type 2 diabetes mellitus with hyperglycemia, without long-term current use of insulin (Multi) (08/29/2023), and Urinary tract infection, site not specified (01/25/2021).    Past Surgical History:  She has a past surgical history that includes Breast biopsy (Right, 11/13/2023); Other surgical history (02/06/2019); Other surgical history (05/11/2022); Other surgical history (05/11/2022); Other surgical history (03/10/2022); Other surgical history (03/10/2022); and CT angio coronary art with heartflow if score >30% (04/12/2022).    Social History:  She reports that she has never smoked. She has never been exposed to tobacco smoke. She has never used smokeless tobacco. She reports that she does not drink alcohol and does not use drugs.    Family History:  Family History   Problem Relation Name Age of Onset    Heart attack Mother      Other (Coronary artery  bypass surgery) Father      Diabetes type II Father          With complication, with long term current use of insulin     Allergies:  Statins-hmg-coa reductase inhibitors    Exercising after breakfast on the treadmill     Current monitoring regimen:   Patient is using: glucometer    Glucose Monitoring: FBG     8/1/24  157 mg/dL (AMFBG)    7 Day average: 135 mg/dL  14 Day average: 138 mg/dL  30 day average: 139 mg/dL  90 day average: 140 mg/dL    Any episodes of hypoglycemia? no    Adverse Effects:   No reported ADRs during encounter    Objective     Last Recorded Vitals:  BP Readings from Last 6 Encounters:   07/22/24 132/68   06/03/24 151/80   05/16/24 130/65   02/14/24 130/68   11/14/23 120/80   11/13/23 126/79     Wt Readings from Last 6 Encounters:   07/22/24 86.6 kg (191 lb)   06/03/24 88.6 kg (195 lb 6.4 oz)   05/16/24 89.4 kg (197 lb 3.2 oz)   02/14/24 85.5 kg (188 lb 9.6 oz)   11/14/23 86.2 kg (190 lb)   11/13/23 85.7 kg (189 lb)     Diabetes Pharmacotherapy:    Ozempic 2 mg: Inject 2 mg under the skin once weekly    Glipizide 5 mg: Take 1 tablet (5 mg) by mouth once daily. 30 minutes before breakfast. If you skip breakfast then take one tablet by mouth 30 minutes before lunch.      Previous treatment:  Metformin - diarrhea     Primary Prevention:   - Statin? No  - ACE-I/ARB? Yes  - Aspirin? No    Pertinent PMH Review:  - PMH of Pancreatitis: No  - PMH of Retinopathy: No  - PMH of Urinary Tract Infections: Yes   - PMH or FH of MEN Type II/MTC: No    Lab Review  Lab Results   Component Value Date    BILITOT 0.6 05/07/2024    CALCIUM 9.8 05/07/2024    CO2 29 05/07/2024     05/07/2024    CREATININE 0.73 05/07/2024    GLUCOSE 89 05/07/2024    ALKPHOS 74 05/07/2024    K 4.4 05/07/2024    PROT 6.7 05/07/2024     05/07/2024    AST 19 05/07/2024    ALT 16 05/07/2024    BUN 20 05/07/2024    ANIONGAP 13 05/07/2024    ALBUMIN 4.4 05/07/2024    GFRF >90 05/17/2023     Lab Results   Component Value Date     "TRIG 136 05/07/2024    CHOL 203 (H) 05/07/2024    LDLCALC 122 (H) 05/07/2024    HDL 54.2 05/07/2024     Lab Results   Component Value Date    HGBA1C 6.7 (H) 05/07/2024    HGBA1C 7.4 (A) 02/14/2024    HGBA1C 6.9 (H) 10/23/2023     Component      Latest Ref Rng 11/2/2023   Albumin, Urine Random      Not established mg/L 7.0    Creatinine, Urine Random      20.0 - 320.0 mg/dL 135.1    Albumin/Creatine Ratio      <30.0 ug/mg Creat 5.2      No components found for: \"UACR\"  The 10-year ASCVD risk score (Jose Alfredo GARDNER, et al., 2019) is: 16.5%    Values used to calculate the score:      Age: 68 years      Sex: Female      Is Non- : No      Diabetic: Yes      Tobacco smoker: No      Systolic Blood Pressure: 132 mmHg      Is BP treated: Yes      HDL Cholesterol: 54.2 mg/dL      Total Cholesterol: 203 mg/dL    Health Maintenance:   Urine Albumin: 11/2/2023 - WNL     Drug Interactions:  Glipizide + Ozempic: Glucagon-Like Peptide-1 Agonists may enhance the hypoglycemic effect of Sulfonylureas    Assessment/Plan   Problem List Items Addressed This Visit       Type 2 diabetes mellitus with hyperglycemia, without long-term current use of insulin (Multi) (Chronic)     Other Visit Diagnoses       BMI 38.0-38.9,adult              Patients diabetes is well controlled with most recent A1c of 6.7% (Goal < 7%). Given patient's glycemic control and desire to lose weight, will decrease dose of sulfonylurea as CASTELLANOS can inhibit weight loss. Follow-up in 3 months.   Continue:   Ozempic 2 mg: Inject 2 mg under the skin once weekly   Decrease:  Glipizide 2.5 mg once daily, 30 minutes before breakfast. If you skip breakfast then take one tablet by mouth 30 minutes before lunch.   Patient notably statin intolerant - future consideration could include use of PCSK9i, ezetimibe, nexletol or nexlizet.   Education Provided to Patient:   Patient advised to avoid taking glipizide if she plans to skip a meal to avoid hypoglycemic " events/symptoms.  Follow-up: Oct 30, 2024 at 10 am  PCP Follow-Up: 9/5/2024    Michael Brooke PharmD    Continue all meds under the continuation of care with the referring provider and clinical pharmacy team.

## 2024-08-05 ENCOUNTER — PHARMACY VISIT (OUTPATIENT)
Dept: PHARMACY | Facility: CLINIC | Age: 69
End: 2024-08-05
Payer: MEDICARE

## 2024-09-05 ENCOUNTER — APPOINTMENT (OUTPATIENT)
Dept: PRIMARY CARE | Facility: CLINIC | Age: 69
End: 2024-09-05
Payer: MEDICARE

## 2024-09-05 VITALS
BODY MASS INDEX: 37.28 KG/M2 | RESPIRATION RATE: 16 BRPM | HEART RATE: 70 BPM | WEIGHT: 189.9 LBS | SYSTOLIC BLOOD PRESSURE: 122 MMHG | HEIGHT: 60 IN | DIASTOLIC BLOOD PRESSURE: 72 MMHG

## 2024-09-05 DIAGNOSIS — I25.10 CORONARY ARTERY DISEASE INVOLVING NATIVE CORONARY ARTERY OF NATIVE HEART WITHOUT ANGINA PECTORIS: Primary | Chronic | ICD-10-CM

## 2024-09-05 DIAGNOSIS — E06.3 HYPOTHYROIDISM DUE TO HASHIMOTO'S THYROIDITIS: ICD-10-CM

## 2024-09-05 DIAGNOSIS — G72.0 STATIN MYOPATHY: ICD-10-CM

## 2024-09-05 DIAGNOSIS — Z00.00 ENCOUNTER FOR PREVENTATIVE ADULT HEALTH CARE EXAMINATION: ICD-10-CM

## 2024-09-05 DIAGNOSIS — Z71.89 CARDIAC RISK COUNSELING: ICD-10-CM

## 2024-09-05 DIAGNOSIS — E06.3 HASHIMOTO'S THYROIDITIS: ICD-10-CM

## 2024-09-05 DIAGNOSIS — T46.6X5A STATIN MYOPATHY: ICD-10-CM

## 2024-09-05 DIAGNOSIS — F33.40 MDD (RECURRENT MAJOR DEPRESSIVE DISORDER) IN REMISSION (CMS-HCC): ICD-10-CM

## 2024-09-05 DIAGNOSIS — Z13.89 ENCOUNTER FOR SCREENING FOR OTHER DISORDER: ICD-10-CM

## 2024-09-05 DIAGNOSIS — E11.65 TYPE 2 DIABETES MELLITUS WITH HYPERGLYCEMIA, WITHOUT LONG-TERM CURRENT USE OF INSULIN (MULTI): Chronic | ICD-10-CM

## 2024-09-05 DIAGNOSIS — E03.8 HYPOTHYROIDISM DUE TO HASHIMOTO'S THYROIDITIS: ICD-10-CM

## 2024-09-05 DIAGNOSIS — E78.2 MIXED HYPERLIPIDEMIA: Chronic | ICD-10-CM

## 2024-09-05 LAB — POC HEMOGLOBIN A1C: 7.1 % (ref 4.2–6.5)

## 2024-09-05 PROCEDURE — 4010F ACE/ARB THERAPY RXD/TAKEN: CPT | Performed by: INTERNAL MEDICINE

## 2024-09-05 PROCEDURE — 3074F SYST BP LT 130 MM HG: CPT | Performed by: INTERNAL MEDICINE

## 2024-09-05 PROCEDURE — 1160F RVW MEDS BY RX/DR IN RCRD: CPT | Performed by: INTERNAL MEDICINE

## 2024-09-05 PROCEDURE — 3078F DIAST BP <80 MM HG: CPT | Performed by: INTERNAL MEDICINE

## 2024-09-05 PROCEDURE — 1126F AMNT PAIN NOTED NONE PRSNT: CPT | Performed by: INTERNAL MEDICINE

## 2024-09-05 PROCEDURE — G0442 ANNUAL ALCOHOL SCREEN 15 MIN: HCPCS | Performed by: INTERNAL MEDICINE

## 2024-09-05 PROCEDURE — 3044F HG A1C LEVEL LT 7.0%: CPT | Performed by: INTERNAL MEDICINE

## 2024-09-05 PROCEDURE — G0446 INTENS BEHAVE THER CARDIO DX: HCPCS | Performed by: INTERNAL MEDICINE

## 2024-09-05 PROCEDURE — G0439 PPPS, SUBSEQ VISIT: HCPCS | Performed by: INTERNAL MEDICINE

## 2024-09-05 PROCEDURE — 99213 OFFICE O/P EST LOW 20 MIN: CPT | Performed by: INTERNAL MEDICINE

## 2024-09-05 PROCEDURE — 3008F BODY MASS INDEX DOCD: CPT | Performed by: INTERNAL MEDICINE

## 2024-09-05 PROCEDURE — 3049F LDL-C 100-129 MG/DL: CPT | Performed by: INTERNAL MEDICINE

## 2024-09-05 PROCEDURE — 83036 HEMOGLOBIN GLYCOSYLATED A1C: CPT | Performed by: INTERNAL MEDICINE

## 2024-09-05 PROCEDURE — 1036F TOBACCO NON-USER: CPT | Performed by: INTERNAL MEDICINE

## 2024-09-05 PROCEDURE — 99397 PER PM REEVAL EST PAT 65+ YR: CPT | Performed by: INTERNAL MEDICINE

## 2024-09-05 PROCEDURE — 1123F ACP DISCUSS/DSCN MKR DOCD: CPT | Performed by: INTERNAL MEDICINE

## 2024-09-05 PROCEDURE — 1158F ADVNC CARE PLAN TLK DOCD: CPT | Performed by: INTERNAL MEDICINE

## 2024-09-05 PROCEDURE — 1159F MED LIST DOCD IN RCRD: CPT | Performed by: INTERNAL MEDICINE

## 2024-09-05 PROCEDURE — 1170F FXNL STATUS ASSESSED: CPT | Performed by: INTERNAL MEDICINE

## 2024-09-05 ASSESSMENT — LIFESTYLE VARIABLES
AUDIT-C TOTAL SCORE: 0
HOW MANY STANDARD DRINKS CONTAINING ALCOHOL DO YOU HAVE ON A TYPICAL DAY: PATIENT DOES NOT DRINK
SKIP TO QUESTIONS 9-10: 1
HOW OFTEN DO YOU HAVE A DRINK CONTAINING ALCOHOL: NEVER
HOW OFTEN DO YOU HAVE SIX OR MORE DRINKS ON ONE OCCASION: NEVER

## 2024-09-05 ASSESSMENT — ACTIVITIES OF DAILY LIVING (ADL)
MANAGING_FINANCES: INDEPENDENT
BATHING: INDEPENDENT
DOING_HOUSEWORK: INDEPENDENT
TAKING_MEDICATION: INDEPENDENT
DRESSING: INDEPENDENT
GROCERY_SHOPPING: INDEPENDENT

## 2024-09-05 ASSESSMENT — PATIENT HEALTH QUESTIONNAIRE - PHQ9
1. LITTLE INTEREST OR PLEASURE IN DOING THINGS: MORE THAN HALF THE DAYS
SUM OF ALL RESPONSES TO PHQ9 QUESTIONS 1 AND 2: 2
7. TROUBLE CONCENTRATING ON THINGS, SUCH AS READING THE NEWSPAPER OR WATCHING TELEVISION: SEVERAL DAYS
2. FEELING DOWN, DEPRESSED OR HOPELESS: SEVERAL DAYS
9. THOUGHTS THAT YOU WOULD BE BETTER OFF DEAD, OR OF HURTING YOURSELF: NOT AT ALL
6. FEELING BAD ABOUT YOURSELF - OR THAT YOU ARE A FAILURE OR HAVE LET YOURSELF OR YOUR FAMILY DOWN: NOT AT ALL
5. POOR APPETITE OR OVEREATING: NOT AT ALL
3. TROUBLE FALLING OR STAYING ASLEEP OR SLEEPING TOO MUCH: SEVERAL DAYS
4. FEELING TIRED OR HAVING LITTLE ENERGY: SEVERAL DAYS
8. MOVING OR SPEAKING SO SLOWLY THAT OTHER PEOPLE COULD HAVE NOTICED. OR THE OPPOSITE, BEING SO FIGETY OR RESTLESS THAT YOU HAVE BEEN MOVING AROUND A LOT MORE THAN USUAL: NOT AT ALL
SUM OF ALL RESPONSES TO PHQ9 QUESTIONS 1 AND 2: 4
2. FEELING DOWN, DEPRESSED OR HOPELESS: MORE THAN HALF THE DAYS
1. LITTLE INTEREST OR PLEASURE IN DOING THINGS: SEVERAL DAYS
SUM OF ALL RESPONSES TO PHQ QUESTIONS 1-9: 7
10. IF YOU CHECKED OFF ANY PROBLEMS, HOW DIFFICULT HAVE THESE PROBLEMS MADE IT FOR YOU TO DO YOUR WORK, TAKE CARE OF THINGS AT HOME, OR GET ALONG WITH OTHER PEOPLE: SOMEWHAT DIFFICULT

## 2024-09-05 ASSESSMENT — PAIN SCALES - GENERAL: PAINLEVEL: 0-NO PAIN

## 2024-09-05 ASSESSMENT — ENCOUNTER SYMPTOMS
CONSTIPATION: 0
DIARRHEA: 0
SHORTNESS OF BREATH: 0

## 2024-09-05 NOTE — PATIENT INSTRUCTIONS
See me again in January for 30 min follow up.    Get blood test and urine test done close to next visit.     Orders are in the system.

## 2024-09-05 NOTE — PROGRESS NOTES
"Subjective   Reason for Visit: Maria Esther Schuster is an 69 y.o. female here for a Medicare Wellness visit.          Reviewed all medications by prescribing practitioner or clinical pharmacist (such as prescriptions, OTCs, herbal therapies and supplements) and documented in the medical record.    HPI    Her son   of \"massive heart attack\" . Was sudden while driving; fortunately he was stopped in traffic when it happened.   He was 47 , she said the  told her that he had a remarkable degree of CAD for his age  She is taking care of his estate, he didn't have a will   He had 2 boys 17 and 22 . They are having a hard time.     She admits she is having trouble; she joined a grief group at the [facility]    Her A1c was 7.1%  He has been taking one hafl of glipizde since her son  per Michael's recomendation; she was not eating and was havong lows  Her am sugars are higher but not over 200 and they go down as the day progresses    Depression screening was completed today using PHQ-2 and was pos , related to grief.  She is still taking citalopram 20 mg daily.  She feels she is actually doing okay and did not want to make any changes  She has started walking and has gone back to the gym.  She said she's been eating more ice cream, but her sugars have not been that bad and her weight is actually lower than the last time she was here.    She has been using tylenol pm to help sleep.  Is  finding it harder to stay motivated.   Falls risk was assessed today. Pt does not use ambulatory aids.   Home safety measures were addressed today.   Functional status was addressed and no concerns per patient today.  Nutritional status completed today.    Alcohol screening was performed today taking 5 min of time. She does not drink alcohol.   Pt is independent with finances, medications and transportation.   Pt lives in own home with her .      The 10-year ASCVD risk score (Jose Alfredo GARDNER, et al., 2019) is: 15.8%    Values used to " calculate the score:      Age: 69 years      Sex: Female      Is Non- : No      Diabetic: Yes      Tobacco smoker: No      Systolic Blood Pressure: 122 mmHg      Is BP treated: Yes      HDL Cholesterol: 54.2 mg/dL      Total Cholesterol: 203 mg/dL     For about 15 minutes, cardiovascular risks was discussed . This patient's  10 year CV risk is   15.8  %. If needed, lifestyle modifications recommended, including nutritional choices, exercise, and elimination of habits contributing to risk.  We agreed on a plan to reduce the current cardiovascular risk based on the above discussion as needed.  She has been unable to tolerate statins or PCSK9  She does have coronary artery disease and had angioplasty and stenting; she is taking 81 mg aspirin daily.       Advanced Care Planning ( including a Living will, Healthcare POA, as well as specific end of life choices or directives) , was discussed with the patient .   The Pt. Has Living Will/HCPOA  Patient Care Team:  Leighann Thomas DO as PCP - General  Leighann Thomas DO as PCP - [employer]age PCP     Review of Systems   Respiratory:  Negative for shortness of breath.    Cardiovascular:  Negative for chest pain.   Gastrointestinal:  Negative for constipation and diarrhea.       Objective   Vitals:  /72   Pulse 70   Resp 16   Ht 1.524 m (5')   Wt 86.1 kg (189 lb 14.4 oz)   BMI 37.09 kg/m²       Physical Exam  Eyes:      Conjunctiva/sclera: Conjunctivae normal.      Pupils: Pupils are equal, round, and reactive to light.   Cardiovascular:      Rate and Rhythm: Normal rate and regular rhythm.      Heart sounds: Normal heart sounds.   Pulmonary:      Effort: Pulmonary effort is normal.      Breath sounds: Normal breath sounds.   Lymphadenopathy:      Cervical: No cervical adenopathy.         Assessment/Plan   Problem List Items Addressed This Visit             ICD-10-CM    Encounter for preventative adult health care examination     Regarding breast cancer screening, she had actually been following with Dr. Camacho and has a mammogram order from June from the breast [facility].  Coronary artery disease - Primary (Chronic) she has been unable to tolerate statins and also PCSK9 meds due to myalgias.  Will recheck her lipids with her next visit.     Relevant Orders    Comprehensive Metabolic Panel    Lipid Panel    Type 2 diabetes mellitus with hyperglycemia, without long-term current use of insulin (Multi) (Chronic) she is taking 2.5 of glipizide and also semaglutide 2 mg weekly.     Relevant Orders    POCT glycosylated hemoglobin (Hb A1C) manually resulted (Completed)    Comprehensive Metabolic Panel    Lipid Panel    Hemoglobin A1C    Albumin-Creatinine Ratio, Urine Random    Creatinine, Urine Random     Other Visit Diagnoses         Codes          Evans dep disorder: she is grieving now due to the death of her son. She is taking citalopram. She really didn't want any other meds right now. She is going to a grief support group.   Hypothyroidism due to Hashimoto's thyroiditis    she is taking levothyroxine 25 mcg daily.     Relevant Orders    TSH with reflex to Free T4 if abnormal

## 2024-10-10 ENCOUNTER — APPOINTMENT (OUTPATIENT)
Dept: RADIOLOGY | Facility: CLINIC | Age: 69
End: 2024-10-10
Payer: MEDICARE

## 2024-10-30 ENCOUNTER — APPOINTMENT (OUTPATIENT)
Dept: PHARMACY | Facility: HOSPITAL | Age: 69
End: 2024-10-30
Payer: MEDICARE

## 2024-10-30 DIAGNOSIS — E11.65 TYPE 2 DIABETES MELLITUS WITH HYPERGLYCEMIA, WITHOUT LONG-TERM CURRENT USE OF INSULIN: ICD-10-CM

## 2024-10-30 PROCEDURE — RXMED WILLOW AMBULATORY MEDICATION CHARGE

## 2024-10-30 RX ORDER — TIRZEPATIDE 5 MG/.5ML
5 INJECTION, SOLUTION SUBCUTANEOUS
Qty: 2 ML | Refills: 1 | Status: SHIPPED | OUTPATIENT
Start: 2024-10-30

## 2024-11-01 ENCOUNTER — PHARMACY VISIT (OUTPATIENT)
Dept: PHARMACY | Facility: CLINIC | Age: 69
End: 2024-11-01
Payer: MEDICARE

## 2024-11-26 ENCOUNTER — APPOINTMENT (OUTPATIENT)
Dept: PHARMACY | Facility: HOSPITAL | Age: 69
End: 2024-11-26
Payer: MEDICARE

## 2024-11-26 DIAGNOSIS — E11.65 TYPE 2 DIABETES MELLITUS WITH HYPERGLYCEMIA, WITHOUT LONG-TERM CURRENT USE OF INSULIN: ICD-10-CM

## 2024-11-26 PROCEDURE — RXMED WILLOW AMBULATORY MEDICATION CHARGE

## 2024-11-26 RX ORDER — TIRZEPATIDE 7.5 MG/.5ML
7.5 INJECTION, SOLUTION SUBCUTANEOUS
Qty: 2 ML | Refills: 5 | Status: SHIPPED | OUTPATIENT
Start: 2024-11-26

## 2024-11-26 NOTE — PROGRESS NOTES
Patient is sent at the request of Leighann Thomas DO for my opinion regarding Type 2 diabetes.  My final recommendations will be communicated back to the requesting provider by way of shared medical record.    Subjective     Maria Esther Schuster is a 68 y.o. year old female patient with type two diabetes mellitus, class II obesity, hypertension, statin myopathy/statin induced myositis, hyperlipidemia and MDD referred for diabetes medication management.    Denies side effects but notes her hunger has been more significant from switching GLP-1s. Notes she has been snacking some. Baking more treats for the holiday.     Notes one instance of low BG (87 mg/dL) that occurred d/t skipping breakfast and lunch.     Weight (goal <175 lbs)  194 lbs 10/30/24    Past Medical History:  She has a past medical history of Angina pectoris (CMS-[facility]) (05/22/2023), Benign essential hypertension (05/22/2023), Coronary artery disease (05/22/2023), Hyperlipidemia (11/10/2023), Muscle pain (05/22/2023), Personal history of other specified conditions, Type 2 diabetes mellitus with hyperglycemia, without long-term current use of insulin (Multi) (08/29/2023), and Urinary tract infection, site not specified (01/25/2021).    Past Surgical History:  She has a past surgical history that includes Breast biopsy (Right, 11/13/2023); Other surgical history (02/06/2019); Other surgical history (05/11/2022); Other surgical history (05/11/2022); Other surgical history (03/10/2022); Other surgical history (03/10/2022); and CT angio coronary art with heartflow if score >30% (04/12/2022).    Social History:  She reports that she has never smoked. She has never been exposed to tobacco smoke. She has never used smokeless tobacco. She reports that she does not drink alcohol and does not use drugs.    Family History:  Family History   Problem Relation Name Age of Onset    Heart attack Mother      Other (Coronary artery bypass surgery) Father      Diabetes type II  Father          With complication, with long term current use of insulin     Allergies:  Statins-hmg-coa reductase inhibitors    Exercise: Walking a mile per day and going to gym 3 days per week for ~30-45 minutes.     Current monitoring regimen:   Patient is using: glucometer  Glucose Monitoring: FBG     BG averages:  7 day 202 mg/dL  14 day 186 mg/dL  30 day 179 mg/dL    AMFBG 187 mg/dL 11/26/24    Any episodes of hypoglycemia? no    Adverse Effects:   No reported ADRs during encounter    Objective     Last Recorded Vitals:  BP Readings from Last 6 Encounters:   09/05/24 122/72   07/22/24 132/68   06/03/24 151/80   05/16/24 130/65   02/14/24 130/68   11/14/23 120/80     Wt Readings from Last 6 Encounters:   09/05/24 86.1 kg (189 lb 14.4 oz)   07/22/24 86.6 kg (191 lb)   06/03/24 88.6 kg (195 lb 6.4 oz)   05/16/24 89.4 kg (197 lb 3.2 oz)   02/14/24 85.5 kg (188 lb 9.6 oz)   11/14/23 86.2 kg (190 lb)     Diabetes Pharmacotherapy:    Mounjaro 5 mg once weekly  Glipizide 5 mg: Take 1 tablet (5 mg) by mouth once daily. 30 minutes before breakfast. If you skip breakfast then take one tablet by mouth 30 minutes before lunch.      Previous treatment:  Metformin - diarrhea     Primary Prevention:   - Statin? No  - ACE-I/ARB? Yes  - Aspirin? No    Pertinent PMH Review:  - PMH of Pancreatitis: No  - PMH of Retinopathy: No  - PMH of Urinary Tract Infections: Yes   - PMH or FH of MEN Type II/MTC: No    Lab Review  Lab Results   Component Value Date    BILITOT 0.6 05/07/2024    CALCIUM 9.8 05/07/2024    CO2 29 05/07/2024     05/07/2024    CREATININE 0.73 05/07/2024    GLUCOSE 89 05/07/2024    ALKPHOS 74 05/07/2024    K 4.4 05/07/2024    PROT 6.7 05/07/2024     05/07/2024    AST 19 05/07/2024    ALT 16 05/07/2024    BUN 20 05/07/2024    ANIONGAP 13 05/07/2024    ALBUMIN 4.4 05/07/2024    GFRF >90 05/17/2023     Lab Results   Component Value Date    TRIG 136 05/07/2024    CHOL 203 (H) 05/07/2024    LDLCALC 122 (H)  "05/07/2024    HDL 54.2 05/07/2024     Lab Results   Component Value Date    HGBA1C 7.1 (A) 09/05/2024    HGBA1C 6.7 (H) 05/07/2024    HGBA1C 7.4 (A) 02/14/2024     Component      Latest Ref Rng 11/2/2023   Albumin, Urine Random      Not established mg/L 7.0    Creatinine, Urine Random      20.0 - 320.0 mg/dL 135.1    Albumin/Creatine Ratio      <30.0 ug/mg Creat 5.2      No components found for: \"UACR\"  The 10-year ASCVD risk score (Jose Alfredo GARDNER, et al., 2019) is: 15.8%    Values used to calculate the score:      Age: 69 years      Sex: Female      Is Non- : No      Diabetic: Yes      Tobacco smoker: No      Systolic Blood Pressure: 122 mmHg      Is BP treated: Yes      HDL Cholesterol: 54.2 mg/dL      Total Cholesterol: 203 mg/dL    Health Maintenance:   Urine Albumin: 11/2/2023 - WNL     Drug Interactions:  Glipizide + Ozempic: Glucagon-Like Peptide-1 Agonists may enhance the hypoglycemic effect of Sulfonylureas    Assessment/Plan   Problem List Items Addressed This Visit       Type 2 diabetes mellitus with hyperglycemia, without long-term current use of insulin (Chronic)     Other Visit Diagnoses       BMI 38.0-38.9,adult            Patients diabetes is borderline controlled with most recent A1c of 7.1% (Goal < 7%). BG and appetite are increased with switch to Mounjaro, will increase dose to target both.    Start:   Mounjaro 7.5 mg once weekly  Continue  Glipizide 5 mg once daily, 30 minutes before breakfast. If you skip breakfast then take one tablet by mouth 30 minutes before lunch.   Patient notably statin, ezetimibe, and PCSK9 intolerant   Patient Assistance valid through 4/30/25  Follow-up: 12/23/2024 at 9:40 am  PCP Follow-Up: 1/9/25    Michael Brooke, [place]    Continue all meds under the continuation of care with the referring provider and clinical pharmacy team.  "

## 2024-11-27 ENCOUNTER — PHARMACY VISIT (OUTPATIENT)
Dept: PHARMACY | Facility: CLINIC | Age: 69
End: 2024-11-27
Payer: MEDICARE

## 2024-11-27 DIAGNOSIS — M17.0 PRIMARY OSTEOARTHRITIS OF BOTH KNEES: ICD-10-CM

## 2024-11-27 RX ORDER — MELOXICAM 7.5 MG/1
7.5 TABLET ORAL DAILY
Qty: 90 TABLET | Refills: 3 | Status: SHIPPED | OUTPATIENT
Start: 2024-11-27

## 2024-11-27 NOTE — TELEPHONE ENCOUNTER
Patient calling about two issues- thinks she might have a urinary track infection. Ask for apt Friday,did explain we are closed then. Patient states will go to urgent care. Had a second issue- is out of meloxicam 7.5mg. can a refill be sent in to [facility].

## 2024-12-23 ENCOUNTER — APPOINTMENT (OUTPATIENT)
Dept: PHARMACY | Facility: HOSPITAL | Age: 69
End: 2024-12-23
Payer: MEDICARE

## 2024-12-23 DIAGNOSIS — E11.65 TYPE 2 DIABETES MELLITUS WITH HYPERGLYCEMIA, WITHOUT LONG-TERM CURRENT USE OF INSULIN: ICD-10-CM

## 2024-12-23 PROCEDURE — RXMED WILLOW AMBULATORY MEDICATION CHARGE

## 2024-12-23 RX ORDER — TIRZEPATIDE 10 MG/.5ML
10 INJECTION, SOLUTION SUBCUTANEOUS
Qty: 2 ML | Refills: 5 | Status: SHIPPED | OUTPATIENT
Start: 2024-12-23

## 2024-12-23 NOTE — PROGRESS NOTES
Patient is sent at the request of Leighann Thomas DO for my opinion regarding Type 2 diabetes.  My final recommendations will be communicated back to the requesting provider by way of shared medical record.    Subjective     Maria Esther Schuster is a 68 y.o. year old female patient with type two diabetes mellitus, class II obesity, hypertension, statin myopathy/statin induced myositis, hyperlipidemia and MDD referred for diabetes medication management.    Patient reports BG numbers are improved with increase dose of Mounjaro. Hasn't noticed much appetite suppression. Denies side effects.     Weight (goal <175 lbs)  194 lbs 10/30/24  194 lbs 12/23/24    Past Medical History:  She has a past medical history of Angina pectoris (CMS-HCC) (05/22/2023), Benign essential hypertension (05/22/2023), Coronary artery disease (05/22/2023), Hyperlipidemia (11/10/2023), Muscle pain (05/22/2023), Personal history of other specified conditions, Type 2 diabetes mellitus with hyperglycemia, without long-term current use of insulin (Multi) (08/29/2023), and Urinary tract infection, site not specified (01/25/2021).    Past Surgical History:  She has a past surgical history that includes Breast biopsy (Right, 11/13/2023); Other surgical history (02/06/2019); Other surgical history (05/11/2022); Other surgical history (05/11/2022); Other surgical history (03/10/2022); Other surgical history (03/10/2022); and CT angio coronary art with heartflow if score >30% (04/12/2022).    Social History:  She reports that she has never smoked. She has never been exposed to tobacco smoke. She has never used smokeless tobacco. She reports that she does not drink alcohol and does not use drugs.    Family History:  Family History   Problem Relation Name Age of Onset    Heart attack Mother      Other (Coronary artery bypass surgery) Father      Diabetes type II Father          With complication, with long term current use of insulin      Allergies:  Statins-hmg-coa reductase inhibitors    Exercise: Walking a mile per day and going to gym 3 days per week for ~30-45 minutes.     Current monitoring regimen:   Patient is using: glucometer  Glucose Monitoring: takes BG every morning and most afternoons     BG averages:  7 day 161 mg/dL  14 day 163 mg/dL  30 day 161 mg/dL  90 day 166 mg/dL    Any episodes of hypoglycemia? no    Adverse Effects:   No reported ADRs during encounter    Objective     Last Recorded Vitals:  BP Readings from Last 6 Encounters:   09/05/24 122/72   07/22/24 132/68   06/03/24 151/80   05/16/24 130/65   02/14/24 130/68   11/14/23 120/80     Wt Readings from Last 6 Encounters:   09/05/24 86.1 kg (189 lb 14.4 oz)   07/22/24 86.6 kg (191 lb)   06/03/24 88.6 kg (195 lb 6.4 oz)   05/16/24 89.4 kg (197 lb 3.2 oz)   02/14/24 85.5 kg (188 lb 9.6 oz)   11/14/23 86.2 kg (190 lb)     Diabetes Pharmacotherapy:    Mounjaro 7.5 mg once weekly  Glipizide 5 mg: Take 1 tablet (5 mg) by mouth once daily. 30 minutes before breakfast. If you skip breakfast then take one tablet by mouth 30 minutes before lunch.      Previous treatment:  Metformin - diarrhea     Primary Prevention:   - Statin? No  - ACE-I/ARB? Yes  - Aspirin? No    Pertinent PMH Review:  - PMH of Pancreatitis: No  - PMH of Retinopathy: No  - PMH of Urinary Tract Infections: Yes   - PMH or FH of MEN Type II/MTC: No    Lab Review  Lab Results   Component Value Date    BILITOT 0.6 05/07/2024    CALCIUM 9.8 05/07/2024    CO2 29 05/07/2024     05/07/2024    CREATININE 0.73 05/07/2024    GLUCOSE 89 05/07/2024    ALKPHOS 74 05/07/2024    K 4.4 05/07/2024    PROT 6.7 05/07/2024     05/07/2024    AST 19 05/07/2024    ALT 16 05/07/2024    BUN 20 05/07/2024    ANIONGAP 13 05/07/2024    ALBUMIN 4.4 05/07/2024    GFRF >90 05/17/2023     Lab Results   Component Value Date    TRIG 136 05/07/2024    CHOL 203 (H) 05/07/2024    LDLCALC 122 (H) 05/07/2024    HDL 54.2 05/07/2024     Lab  "Results   Component Value Date    HGBA1C 7.1 (A) 09/05/2024    HGBA1C 6.7 (H) 05/07/2024    HGBA1C 7.4 (A) 02/14/2024     Component      Latest Ref Rng 11/2/2023   Albumin, Urine Random      Not established mg/L 7.0    Creatinine, Urine Random      20.0 - 320.0 mg/dL 135.1    Albumin/Creatine Ratio      <30.0 ug/mg Creat 5.2      No components found for: \"UACR\"  The 10-year ASCVD risk score (Jose Alfredo GARDNER, et al., 2019) is: 15.8%    Values used to calculate the score:      Age: 69 years      Sex: Female      Is Non- : No      Diabetic: Yes      Tobacco smoker: No      Systolic Blood Pressure: 122 mmHg      Is BP treated: Yes      HDL Cholesterol: 54.2 mg/dL      Total Cholesterol: 203 mg/dL    Health Maintenance:   Urine Albumin: 11/2/2023 - WNL     Drug Interactions:  Glipizide + Ozempic: Glucagon-Like Peptide-1 Agonists may enhance the hypoglycemic effect of Sulfonylureas    Assessment/Plan   Problem List Items Addressed This Visit       Type 2 diabetes mellitus with hyperglycemia, without long-term current use of insulin (Chronic)    Relevant Medications    tirzepatide (Mounjaro) 10 mg/0.5 mL pen injector    Other Relevant Orders    Referral to Clinical Pharmacy     Other Visit Diagnoses       BMI 38.0-38.9,adult        Relevant Orders    Referral to Clinical Pharmacy        Patients diabetes is borderline controlled with most recent A1c of 7.1% (Goal < 7%). BG have improved but appetite suppression is not being maintained. Plan to further increase dose.   Increase:   Mounjaro 10 mg once weekly  Continue  Glipizide 5 mg once daily, 30 minutes before breakfast. If you skip breakfast then take one tablet by mouth 30 minutes before lunch.   Patient notably statin, ezetimibe, and PCSK9 intolerant   Patient Assistance valid through 4/30/25  Follow-up: 1/20/2025 at 9:40 am  PCP Follow-Up: 1/9/25    Michael Brooke, PharmD    Continue all meds under the continuation of care with the referring " provider and clinical pharmacy team.

## 2024-12-24 ENCOUNTER — PHARMACY VISIT (OUTPATIENT)
Dept: PHARMACY | Facility: CLINIC | Age: 69
End: 2024-12-24
Payer: MEDICARE

## 2025-01-02 ENCOUNTER — LAB (OUTPATIENT)
Dept: LAB | Facility: LAB | Age: 70
End: 2025-01-02
Payer: MEDICARE

## 2025-01-02 DIAGNOSIS — I25.10 CORONARY ARTERY DISEASE INVOLVING NATIVE CORONARY ARTERY OF NATIVE HEART WITHOUT ANGINA PECTORIS: Chronic | ICD-10-CM

## 2025-01-02 DIAGNOSIS — E11.65 TYPE 2 DIABETES MELLITUS WITH HYPERGLYCEMIA, WITHOUT LONG-TERM CURRENT USE OF INSULIN: Chronic | ICD-10-CM

## 2025-01-02 DIAGNOSIS — E06.3 HYPOTHYROIDISM DUE TO HASHIMOTO'S THYROIDITIS: ICD-10-CM

## 2025-01-02 LAB
ALBUMIN SERPL BCP-MCNC: 4.4 G/DL (ref 3.4–5)
ALP SERPL-CCNC: 70 U/L (ref 33–136)
ALT SERPL W P-5'-P-CCNC: 13 U/L (ref 7–45)
ANION GAP SERPL CALC-SCNC: 12 MMOL/L (ref 10–20)
AST SERPL W P-5'-P-CCNC: 17 U/L (ref 9–39)
BILIRUB SERPL-MCNC: 0.5 MG/DL (ref 0–1.2)
BUN SERPL-MCNC: 20 MG/DL (ref 6–23)
CALCIUM SERPL-MCNC: 9.2 MG/DL (ref 8.6–10.6)
CHLORIDE SERPL-SCNC: 103 MMOL/L (ref 98–107)
CHOLEST SERPL-MCNC: 224 MG/DL (ref 0–199)
CHOLESTEROL/HDL RATIO: 4.2
CO2 SERPL-SCNC: 28 MMOL/L (ref 21–32)
CREAT SERPL-MCNC: 0.87 MG/DL (ref 0.5–1.05)
CREAT UR-MCNC: 122.9 MG/DL (ref 20–320)
EGFRCR SERPLBLD CKD-EPI 2021: 73 ML/MIN/1.73M*2
EST. AVERAGE GLUCOSE BLD GHB EST-MCNC: 151 MG/DL
GLUCOSE SERPL-MCNC: 171 MG/DL (ref 74–99)
HBA1C MFR BLD: 6.9 %
HDLC SERPL-MCNC: 53.5 MG/DL
LDLC SERPL CALC-MCNC: 144 MG/DL
MICROALBUMIN UR-MCNC: <7 MG/L
MICROALBUMIN/CREAT UR: NORMAL MG/G{CREAT}
NON HDL CHOLESTEROL: 171 MG/DL (ref 0–149)
POTASSIUM SERPL-SCNC: 4.9 MMOL/L (ref 3.5–5.3)
PROT SERPL-MCNC: 6.7 G/DL (ref 6.4–8.2)
SODIUM SERPL-SCNC: 138 MMOL/L (ref 136–145)
T4 FREE SERPL-MCNC: 1.11 NG/DL (ref 0.78–1.48)
TRIGL SERPL-MCNC: 131 MG/DL (ref 0–149)
TSH SERPL-ACNC: 5.62 MIU/L (ref 0.44–3.98)
VLDL: 26 MG/DL (ref 0–40)

## 2025-01-02 PROCEDURE — 82570 ASSAY OF URINE CREATININE: CPT

## 2025-01-02 PROCEDURE — 82043 UR ALBUMIN QUANTITATIVE: CPT

## 2025-01-02 PROCEDURE — 84443 ASSAY THYROID STIM HORMONE: CPT

## 2025-01-02 PROCEDURE — 83036 HEMOGLOBIN GLYCOSYLATED A1C: CPT

## 2025-01-02 PROCEDURE — 84439 ASSAY OF FREE THYROXINE: CPT

## 2025-01-02 PROCEDURE — 80061 LIPID PANEL: CPT

## 2025-01-02 PROCEDURE — 80053 COMPREHEN METABOLIC PANEL: CPT

## 2025-01-09 ENCOUNTER — APPOINTMENT (OUTPATIENT)
Dept: PRIMARY CARE | Facility: CLINIC | Age: 70
End: 2025-01-09
Payer: MEDICARE

## 2025-01-09 VITALS
HEART RATE: 74 BPM | SYSTOLIC BLOOD PRESSURE: 118 MMHG | HEIGHT: 60 IN | WEIGHT: 194 LBS | BODY MASS INDEX: 38.09 KG/M2 | DIASTOLIC BLOOD PRESSURE: 78 MMHG | RESPIRATION RATE: 16 BRPM

## 2025-01-09 DIAGNOSIS — E11.65 TYPE 2 DIABETES MELLITUS WITH HYPERGLYCEMIA, WITHOUT LONG-TERM CURRENT USE OF INSULIN: Primary | Chronic | ICD-10-CM

## 2025-01-09 DIAGNOSIS — F51.02 INSOMNIA DUE TO PSYCHOLOGICAL STRESS: ICD-10-CM

## 2025-01-09 DIAGNOSIS — F43.29 STRESS AND ADJUSTMENT REACTION: ICD-10-CM

## 2025-01-09 DIAGNOSIS — I10 BENIGN ESSENTIAL HYPERTENSION: Chronic | ICD-10-CM

## 2025-01-09 DIAGNOSIS — F41.1 GENERALIZED ANXIETY DISORDER: ICD-10-CM

## 2025-01-09 DIAGNOSIS — E66.812 CLASS 2 SEVERE OBESITY DUE TO EXCESS CALORIES WITH SERIOUS COMORBIDITY AND BODY MASS INDEX (BMI) OF 37.0 TO 37.9 IN ADULT: ICD-10-CM

## 2025-01-09 DIAGNOSIS — E06.3 HASHIMOTO'S THYROIDITIS: ICD-10-CM

## 2025-01-09 DIAGNOSIS — I25.10 CORONARY ARTERY DISEASE INVOLVING NATIVE CORONARY ARTERY OF NATIVE HEART WITHOUT ANGINA PECTORIS: Chronic | ICD-10-CM

## 2025-01-09 DIAGNOSIS — E06.3 HYPOTHYROIDISM DUE TO HASHIMOTO'S THYROIDITIS: ICD-10-CM

## 2025-01-09 DIAGNOSIS — L30.9 ECZEMA, UNSPECIFIED TYPE: ICD-10-CM

## 2025-01-09 DIAGNOSIS — E66.01 CLASS 2 SEVERE OBESITY DUE TO EXCESS CALORIES WITH SERIOUS COMORBIDITY AND BODY MASS INDEX (BMI) OF 37.0 TO 37.9 IN ADULT: ICD-10-CM

## 2025-01-09 PROCEDURE — 1126F AMNT PAIN NOTED NONE PRSNT: CPT | Performed by: INTERNAL MEDICINE

## 2025-01-09 PROCEDURE — 1158F ADVNC CARE PLAN TLK DOCD: CPT | Performed by: INTERNAL MEDICINE

## 2025-01-09 PROCEDURE — 3074F SYST BP LT 130 MM HG: CPT | Performed by: INTERNAL MEDICINE

## 2025-01-09 PROCEDURE — 4010F ACE/ARB THERAPY RXD/TAKEN: CPT | Performed by: INTERNAL MEDICINE

## 2025-01-09 PROCEDURE — 1123F ACP DISCUSS/DSCN MKR DOCD: CPT | Performed by: INTERNAL MEDICINE

## 2025-01-09 PROCEDURE — 3044F HG A1C LEVEL LT 7.0%: CPT | Performed by: INTERNAL MEDICINE

## 2025-01-09 PROCEDURE — 3062F POS MACROALBUMINURIA REV: CPT | Performed by: INTERNAL MEDICINE

## 2025-01-09 PROCEDURE — 3008F BODY MASS INDEX DOCD: CPT | Performed by: INTERNAL MEDICINE

## 2025-01-09 PROCEDURE — 1036F TOBACCO NON-USER: CPT | Performed by: INTERNAL MEDICINE

## 2025-01-09 PROCEDURE — 1159F MED LIST DOCD IN RCRD: CPT | Performed by: INTERNAL MEDICINE

## 2025-01-09 PROCEDURE — 3078F DIAST BP <80 MM HG: CPT | Performed by: INTERNAL MEDICINE

## 2025-01-09 PROCEDURE — 99214 OFFICE O/P EST MOD 30 MIN: CPT | Performed by: INTERNAL MEDICINE

## 2025-01-09 PROCEDURE — G2211 COMPLEX E/M VISIT ADD ON: HCPCS | Performed by: INTERNAL MEDICINE

## 2025-01-09 PROCEDURE — 3050F LDL-C >= 130 MG/DL: CPT | Performed by: INTERNAL MEDICINE

## 2025-01-09 RX ORDER — BUPROPION HYDROCHLORIDE 150 MG/1
150 TABLET ORAL EVERY MORNING
Qty: 90 TABLET | Refills: 0 | Status: SHIPPED | OUTPATIENT
Start: 2025-01-09

## 2025-01-09 RX ORDER — LEVOTHYROXINE SODIUM 50 UG/1
50 TABLET ORAL DAILY
Qty: 90 TABLET | Refills: 0 | Status: SHIPPED | OUTPATIENT
Start: 2025-01-09

## 2025-01-09 RX ORDER — TRIAMCINOLONE ACETONIDE 1 MG/G
OINTMENT TOPICAL
Qty: 60 G | Refills: 0 | Status: SHIPPED | OUTPATIENT
Start: 2025-01-09

## 2025-01-09 RX ORDER — TRAZODONE HYDROCHLORIDE 50 MG/1
50 TABLET ORAL NIGHTLY PRN
Qty: 90 TABLET | Refills: 0 | Status: SHIPPED | OUTPATIENT
Start: 2025-01-09 | End: 2026-01-09

## 2025-01-09 ASSESSMENT — PAIN SCALES - GENERAL: PAINLEVEL_OUTOF10: 0-NO PAIN

## 2025-01-09 NOTE — PATIENT INSTRUCTIONS
Switch from citalopram (celexa) to  bupropion 150 XL every morning.   For the first 5 days take the citalopram (celexa) and bupropion XL at the same time.  After that , stop citalopram and just take bupropion 150 XL.     At night begin taking trazodone 50 mg for sleep; you can start out with just one half tablet.     Begin taking levothyroxine 50 mcg daily.    Use triamcinolone ointment to areas on face twice a day as  needed.  Use a non scented moisturizer in between.     See me again in 3 months for 30 min visit.   Close to next visit get blood test for A1c and thyroid blood test.

## 2025-01-09 NOTE — PROGRESS NOTES
"Subjective   Patient ID: Maria Esther Schuster is a 69 y.o. female who presents for Follow-up (Pt has c/o sleep issues. She is averaging 4-5 hours of sleep a night. She is wanting something for anxiety.).    HPI     She averages 4-5 hours of sleep  She is struggling to take care of all of his financial issues  Were some complications , as the  he used had a stroke and she could not contact him.  Is very frustrating for her, as his records sleeping were not good     She says that she \"just can't shut off the worrying\"  She says that she is taking citalopram but does not feel it is helping    She recently switched to Mounjaro; frustrated with poor progress with weight loss  She said she only lost about 14 pounds; we discussed that many times the average weight loss remains at around 15 pounds throughout the course of therapy with GLP-1 RA meds.  Weight is up to 194 from last 189  Her A1c was 6.9   Urine alb was neg  TSH was 5.62  Her LDL was 114, HDL 53.5   Review of Systems      Current Outpatient Medications:     aspirin 81 mg EC tablet, Take 1 tablet (81 mg) by mouth once daily., Disp: , Rfl:     blood sugar diagnostic (OneTouch Verio test strips) strip, check sugar three times a day, in morning, before dinner and before bedtime, Disp: , Rfl:     citalopram (CeleXA) 20 mg tablet, Take 1 tablet (20 mg) by mouth once daily., Disp: 90 tablet, Rfl: 3    co-enzyme Q-10 30 mg capsule, Take 1 capsule (30 mg) by mouth once daily., Disp: , Rfl:     glipiZIDE (Glucotrol) 5 mg tablet, Take 1 tablet (5 mg) by mouth once daily. 30 minutes before breakfast. If you skip breakfast then take one tablet by mouth 30 minutes before lunch. (Patient taking differently: Take 0.5 tablets (2.5 mg) by mouth once daily. 30 minutes before breakfast. If you skip breakfast then take one tablet by mouth 30 minutes before lunch.), Disp: 90 tablet, Rfl: 3    hydrocortisone 2.5 % cream, APPLY GENEROUSLY TO AFFECTED AREAS BEFORE AND AFTER " SWIMMING., Disp: , Rfl:     levothyroxine (Synthroid) 25 mcg tablet, Take 1 tablet (25 mcg) by mouth once daily in the morning. Take before meals., Disp: 90 tablet, Rfl: 3    lisinopril 20 mg tablet, Take 1 tablet (20 mg) by mouth once daily., Disp: 90 tablet, Rfl: 3    meloxicam (Mobic) 7.5 mg tablet, Take 1 tablet (7.5 mg) by mouth once daily., Disp: 90 tablet, Rfl: 3    metoprolol succinate XL (Toprol-XL) 25 mg 24 hr tablet, TAKE ONE TABLET BY MOUTH DAILY, Disp: 90 tablet, Rfl: 3    tirzepatide (Mounjaro) 10 mg/0.5 mL pen injector, Inject 10 mg under the skin every 7 days., Disp: 2 mL, Rfl: 5    Objective   /78   Pulse 74   Resp 16   Ht 1.524 m (5')   Wt 88 kg (194 lb)   BMI 37.89 kg/m²     Physical Exam  Eyes:      Extraocular Movements: Extraocular movements intact.      Conjunctiva/sclera: Conjunctivae normal.      Pupils: Pupils are equal, round, and reactive to light.   Cardiovascular:      Rate and Rhythm: Normal rate and regular rhythm.      Heart sounds: Normal heart sounds.   Pulmonary:      Effort: Pulmonary effort is normal.      Breath sounds: Normal breath sounds.   Abdominal:      Palpations: Abdomen is soft.         Assessment/Plan   Problem List Items Addressed This Visit         I am the internal medicine physician who provides ongoing chronic medical care for this physician.  This includes management during office visits and also in between office visits.     Type 2 diabetes mellitus with hyperglycemia, without long-term current use of insulin - Primary (Chronic) remain on glipizide 2.5 mg daily and Mounjaro 10 mg weekly.  She was unable to tolerate metformin.  Due for A1c in 3 months.   Relevant Orders   Hemoglobin A1C     Benign essential hypertension (Chronic) blood pressure controlled.  Remain on lisinopril 20 mg daily.    Coronary artery disease (Chronic) she has no chest pain.  She is unable to tolerate statins or Repatha all due to myalgias.    Class 2 severe obesity due to  excess calories with serious comorbidity and body mass index (BMI) of 37.0 to 37.9 in adult her weight went up some, may likely be related to current stresses regarding having to handle her  son's finances.                Hypothyroidism due to Hashimoto's thyroiditis her TSH was somewhat elevated.  We increased her levothyroxine to 50 mcg daily.  She wonders if this is why she has not been losing as much weight as she thought she would.  Will check her TSH before her next visit.    Relevant Medications    levothyroxine (Synthroid, Levoxyl) 50 mcg tablet    Generalized anxiety disorder changing her to Wellbutrin  mg daily.  She can overlap with citalopram for for the first 5 days and then after that to stay on bupropion.  Discussed that this antidepressant has less weight gain attributed to it.    Insomnia due to psychological stress she tried taking melatonin at the very lowest dose but she would feel groggy in the morning so she stopped it.  Will begin trazodone 50 mg; okay to start with taking 1/2 tablet nightly as she is concerned she will be very sensitive to these meds.    Relevant Medications    traZODone (Desyrel) 50 mg tablet           Eczema, unspecified type    she is complaining of what seems like eczema around the corners of her mouth.  She thought it might be impetigo, but I feel it is more eczema and is probably exacerbated by her current stress.  Trial of triamcinolone 0.1% ointment twice daily as needed.  She should also apply nonscented moisturizer to her skin as needed.   Relevant Medications   triamcinolone (Kenalog) 0.1 % ointment               See me again in 3 months.

## 2025-01-20 ENCOUNTER — APPOINTMENT (OUTPATIENT)
Dept: PHARMACY | Facility: HOSPITAL | Age: 70
End: 2025-01-20
Payer: MEDICARE

## 2025-01-20 DIAGNOSIS — E11.65 TYPE 2 DIABETES MELLITUS WITH HYPERGLYCEMIA, WITHOUT LONG-TERM CURRENT USE OF INSULIN: ICD-10-CM

## 2025-01-20 PROCEDURE — RXMED WILLOW AMBULATORY MEDICATION CHARGE

## 2025-01-20 RX ORDER — TIRZEPATIDE 12.5 MG/.5ML
12.5 INJECTION, SOLUTION SUBCUTANEOUS
Qty: 2 ML | Refills: 5 | Status: SHIPPED | OUTPATIENT
Start: 2025-01-20

## 2025-01-20 NOTE — PROGRESS NOTES
Patient is sent at the request of Leighann Thomas DO for my opinion regarding Type 2 diabetes.  My final recommendations will be communicated back to the requesting provider by way of shared medical record.    Subjective     Maria Esther Schuster is a 68 y.o. year old female patient with type two diabetes mellitus, class II obesity, hypertension, statin myopathy/statin induced myositis, hyperlipidemia and MDD referred for diabetes medication management.    Patient denies side effects. Patient notes she has been going to the gym 2-3 times per week. Focusing more on weight training with her legs. Additionally walks 0.5-1 mile and has recently started using the stationary bicycle. Reports more snacking at nighttime.     Weight (goal <175 lbs)  194 lbs 10/30/24  194 lbs 12/23/24  194 lbs 1/20/25    Past Medical History:  She has a past medical history of Angina pectoris (05/22/2023), Benign essential hypertension (05/22/2023), Coronary artery disease (05/22/2023), Hyperlipidemia (11/10/2023), Muscle pain (05/22/2023), Personal history of other specified conditions, Type 2 diabetes mellitus with hyperglycemia, without long-term current use of insulin (08/29/2023), and Urinary tract infection, site not specified (01/25/2021).    Past Surgical History:  She has a past surgical history that includes Breast biopsy (Right, 11/13/2023); Other surgical history (02/06/2019); Other surgical history (05/11/2022); Other surgical history (05/11/2022); Other surgical history (03/10/2022); Other surgical history (03/10/2022); and CT angio coronary art with heartflow if score >30% (04/12/2022).    Social History:  She reports that she has never smoked. She has never been exposed to tobacco smoke. She has never used smokeless tobacco. She reports that she does not drink alcohol and does not use drugs.    Family History:  Family History   Problem Relation Name Age of Onset    Heart attack Mother      Other (Coronary artery bypass surgery) Father       Diabetes type II Father          With complication, with long term current use of insulin     Allergies:  Statins-hmg-coa reductase inhibitors    Exercise: Walking a mile per day and going to gym 3 days per week for ~30-45 minutes.     Current monitoring regimen:   Patient is using: glucometer  Glucose Monitoring: takes BG every morning and most afternoons      mg/dL 1/20/25    BG averages:  7 day 157 mg/dL  14 day 155 mg/dL  30 day 162 mg/dL  90 day 168 mg/dL    Any episodes of hypoglycemia? no    Adverse Effects:   No reported ADRs during encounter    Objective     Last Recorded Vitals:  BP Readings from Last 6 Encounters:   01/09/25 118/78   09/05/24 122/72   07/22/24 132/68   06/03/24 151/80   05/16/24 130/65   02/14/24 130/68     Wt Readings from Last 6 Encounters:   01/09/25 88 kg (194 lb)   09/05/24 86.1 kg (189 lb 14.4 oz)   07/22/24 86.6 kg (191 lb)   06/03/24 88.6 kg (195 lb 6.4 oz)   05/16/24 89.4 kg (197 lb 3.2 oz)   02/14/24 85.5 kg (188 lb 9.6 oz)     Diabetes Pharmacotherapy:    Mounjaro 10 mg once weekly  Glipizide 5 mg: Take 1 tablet (5 mg) by mouth once daily. 30 minutes before breakfast. If you skip breakfast then take one tablet by mouth 30 minutes before lunch.      Previous treatment:  Metformin - diarrhea     Primary Prevention:   - Statin? No  - ACE-I/ARB? Yes  - Aspirin? No    Pertinent PMH Review:  - PMH of Pancreatitis: No  - PMH of Retinopathy: No  - PMH of Urinary Tract Infections: Yes   - PMH or FH of MEN Type II/MTC: No    Lab Review  Lab Results   Component Value Date    BILITOT 0.5 01/02/2025    CALCIUM 9.2 01/02/2025    CO2 28 01/02/2025     01/02/2025    CREATININE 0.87 01/02/2025    GLUCOSE 171 (H) 01/02/2025    ALKPHOS 70 01/02/2025    K 4.9 01/02/2025    PROT 6.7 01/02/2025     01/02/2025    AST 17 01/02/2025    ALT 13 01/02/2025    BUN 20 01/02/2025    ANIONGAP 12 01/02/2025    ALBUMIN 4.4 01/02/2025    GFRF >90 05/17/2023     Lab Results   Component  "Value Date    TRIG 131 01/02/2025    CHOL 224 (H) 01/02/2025    LDLCALC 144 (H) 01/02/2025    HDL 53.5 01/02/2025     Lab Results   Component Value Date    HGBA1C 6.9 (H) 01/02/2025    HGBA1C 7.1 (A) 09/05/2024    HGBA1C 6.7 (H) 05/07/2024     Component      Latest Ref Rng 11/2/2023   Albumin, Urine Random      Not established mg/L 7.0    Creatinine, Urine Random      20.0 - 320.0 mg/dL 135.1    Albumin/Creatine Ratio      <30.0 ug/mg Creat 5.2      No components found for: \"UACR\"  The 10-year ASCVD risk score (Jose Alfredo GARDNER, et al., 2019) is: 15.5%    Values used to calculate the score:      Age: 69 years      Sex: Female      Is Non- : No      Diabetic: Yes      Tobacco smoker: No      Systolic Blood Pressure: 118 mmHg      Is BP treated: Yes      HDL Cholesterol: 53.5 mg/dL      Total Cholesterol: 224 mg/dL    Health Maintenance:   Urine Albumin: 11/2/2023 - WNL     Drug Interactions:  Glipizide + Ozempic: Glucagon-Like Peptide-1 Agonists may enhance the hypoglycemic effect of Sulfonylureas    Assessment/Plan   Problem List Items Addressed This Visit       Type 2 diabetes mellitus with hyperglycemia, without long-term current use of insulin (Chronic)    Relevant Medications    tirzepatide (Mounjaro) 12.5 mg/0.5 mL pen injector    Other Relevant Orders    Referral to Clinical Pharmacy     Other Visit Diagnoses       BMI 38.0-38.9,adult        Relevant Orders    Referral to Clinical Pharmacy        Patients diabetes is borderline controlled with most recent A1c of 6.9% (Goal < 7%). Given improvement in A1c and with plan to increase GLP-1 to improve appetite and glycemic management, will decrease CASTELLANOS as this may be limiting weight loss.   Increase:   Mounjaro 12.5 mg once weekly  Decrease  Glipizide 2.5 mg once daily, 30 minutes before breakfast. If you skip breakfast then take one tablet by mouth 30 minutes before lunch.   Patient notably statin, ezetimibe, and PCSK9 intolerant  [facility] Patient " Assistance valid through 4/30/25  Follow-up: 2/17/2025 at 9:40 am  PCP Follow-Up: 4/10/25    Michael Brooke PharmD    Continue all meds under the continuation of care with the referring provider and clinical pharmacy team.

## 2025-01-21 ENCOUNTER — PHARMACY VISIT (OUTPATIENT)
Dept: PHARMACY | Facility: CLINIC | Age: 70
End: 2025-01-21
Payer: MEDICARE

## 2025-01-30 ENCOUNTER — TELEPHONE (OUTPATIENT)
Dept: PRIMARY CARE | Facility: CLINIC | Age: 70
End: 2025-01-30
Payer: MEDICARE

## 2025-01-30 DIAGNOSIS — F41.1 GENERALIZED ANXIETY DISORDER: Primary | ICD-10-CM

## 2025-01-30 RX ORDER — LORAZEPAM 0.5 MG/1
TABLET ORAL
Qty: 21 TABLET | Refills: 0 | Status: SHIPPED | OUTPATIENT
Start: 2025-01-30

## 2025-01-30 NOTE — TELEPHONE ENCOUNTER
The directions I am giving for the lorazepam prescription are: one half or 1 tablet up to 3 times a day as needed for anxiety.  I sent it to her [facility] pharmacy.

## 2025-01-30 NOTE — TELEPHONE ENCOUNTER
Patient told to call back if anxiety medicine is not helping.  Her anxiety level has lessened. Asking for a different medication. Also noticed more depression with this med.   Uses [facility]

## 2025-01-30 NOTE — TELEPHONE ENCOUNTER
I spoke with Maria Esther and read Dr. Thomas's message. She will come in next Wednesday at 11:45.

## 2025-02-05 ENCOUNTER — APPOINTMENT (OUTPATIENT)
Dept: PRIMARY CARE | Facility: CLINIC | Age: 70
End: 2025-02-05
Payer: MEDICARE

## 2025-02-05 VITALS
WEIGHT: 192.8 LBS | HEART RATE: 67 BPM | DIASTOLIC BLOOD PRESSURE: 74 MMHG | SYSTOLIC BLOOD PRESSURE: 122 MMHG | HEIGHT: 60 IN | BODY MASS INDEX: 37.85 KG/M2 | RESPIRATION RATE: 16 BRPM

## 2025-02-05 DIAGNOSIS — F33.40 MDD (RECURRENT MAJOR DEPRESSIVE DISORDER) IN REMISSION (CMS-HCC): ICD-10-CM

## 2025-02-05 DIAGNOSIS — F41.1 GENERALIZED ANXIETY DISORDER: Primary | ICD-10-CM

## 2025-02-05 PROCEDURE — 1159F MED LIST DOCD IN RCRD: CPT | Performed by: INTERNAL MEDICINE

## 2025-02-05 PROCEDURE — 99214 OFFICE O/P EST MOD 30 MIN: CPT | Performed by: INTERNAL MEDICINE

## 2025-02-05 PROCEDURE — 3062F POS MACROALBUMINURIA REV: CPT | Performed by: INTERNAL MEDICINE

## 2025-02-05 PROCEDURE — 4010F ACE/ARB THERAPY RXD/TAKEN: CPT | Performed by: INTERNAL MEDICINE

## 2025-02-05 PROCEDURE — 1126F AMNT PAIN NOTED NONE PRSNT: CPT | Performed by: INTERNAL MEDICINE

## 2025-02-05 PROCEDURE — 1158F ADVNC CARE PLAN TLK DOCD: CPT | Performed by: INTERNAL MEDICINE

## 2025-02-05 PROCEDURE — 3008F BODY MASS INDEX DOCD: CPT | Performed by: INTERNAL MEDICINE

## 2025-02-05 PROCEDURE — 3044F HG A1C LEVEL LT 7.0%: CPT | Performed by: INTERNAL MEDICINE

## 2025-02-05 PROCEDURE — 1123F ACP DISCUSS/DSCN MKR DOCD: CPT | Performed by: INTERNAL MEDICINE

## 2025-02-05 PROCEDURE — 3074F SYST BP LT 130 MM HG: CPT | Performed by: INTERNAL MEDICINE

## 2025-02-05 PROCEDURE — 3078F DIAST BP <80 MM HG: CPT | Performed by: INTERNAL MEDICINE

## 2025-02-05 PROCEDURE — 3050F LDL-C >= 130 MG/DL: CPT | Performed by: INTERNAL MEDICINE

## 2025-02-05 PROCEDURE — 1036F TOBACCO NON-USER: CPT | Performed by: INTERNAL MEDICINE

## 2025-02-05 RX ORDER — ESCITALOPRAM OXALATE 10 MG/1
10 TABLET ORAL DAILY
Qty: 30 TABLET | Refills: 2 | Status: SHIPPED | OUTPATIENT
Start: 2025-02-05 | End: 2025-05-06

## 2025-02-05 ASSESSMENT — ENCOUNTER SYMPTOMS
SHORTNESS OF BREATH: 0
SLEEP DISTURBANCE: 1
NERVOUS/ANXIOUS: 1

## 2025-02-05 ASSESSMENT — PAIN SCALES - GENERAL: PAINLEVEL_OUTOF10: 0-NO PAIN

## 2025-02-05 NOTE — PATIENT INSTRUCTIONS
Stop taking citalopram ( Celexa ).  Begin taking escitalopram ( lexapro) 10 mg once daily in place of it.    Continue with trazodone and lorazepam as needed.     Keep next visit .

## 2025-02-05 NOTE — PROGRESS NOTES
Subjective   Patient ID: Maria Esther Schuster is a 69 y.o. female who presents for No chief complaint on file..    HPI     Patient presents today for a follow up for anxiety and MDD. Last seen in clinic on 1/9/25.     She tried Wellbutrin and did not like it she felt like it made her worse.  Dealing with a lot of stress dealing with her son and [employer].  Her anxiety is worsening over these issues.   She is taking 1/2 of a trazodone at night and is sleeping well.   She is also taking Lorazepam as needed. She takes it around bed time to help her sleep but has not been taking during the day due to feeling drowsy.   She does not want to become addicted to the Ativan so she tries to take sparingly.  She does not feel like she is having any side effects from Trazodone or Ativan.   She is does not take benadryl anymore.    She was recently increased on Mounjaro by pharmacy.   She feels well on it.     Review of Systems   Respiratory:  Negative for shortness of breath.    Cardiovascular:  Negative for chest pain.   Psychiatric/Behavioral:  Positive for sleep disturbance. The patient is nervous/anxious.         Still having some anxiety and sleep disturbances due to daily stressed but starting to feel better on medication         Current Outpatient Medications:     aspirin 81 mg EC tablet, Take 1 tablet (81 mg) by mouth once daily., Disp: , Rfl:     blood sugar diagnostic (OneTouch Verio test strips) strip, check sugar three times a day, in morning, before dinner and before bedtime, Disp: , Rfl:     co-enzyme Q-10 30 mg capsule, Take 1 capsule (30 mg) by mouth once daily., Disp: , Rfl:     escitalopram (Lexapro) 10 mg tablet, Take 1 tablet (10 mg) by mouth once daily., Disp: 30 tablet, Rfl: 2    glipiZIDE (Glucotrol) 5 mg tablet, Take 1 tablet (5 mg) by mouth once daily. 30 minutes before breakfast. If you skip breakfast then take one tablet by mouth 30 minutes before lunch. (Patient taking differently: Take 0.5 tablets (2.5 mg) by  mouth once daily. 30 minutes before breakfast. If you skip breakfast then take one tablet by mouth 30 minutes before lunch.), Disp: 90 tablet, Rfl: 3    hydrocortisone 2.5 % cream, APPLY GENEROUSLY TO AFFECTED AREAS BEFORE AND AFTER SWIMMING., Disp: , Rfl:     levothyroxine (Synthroid, Levoxyl) 50 mcg tablet, Take 1 tablet (50 mcg) by mouth early in the morning.. Take on an empty stomach at the same time each day, either 30 to 60 minutes prior to breakfast, Disp: 90 tablet, Rfl: 0    lisinopril 20 mg tablet, Take 1 tablet (20 mg) by mouth once daily., Disp: 90 tablet, Rfl: 3    LORazepam (Ativan) 0.5 mg tablet, One half or one tablet up to 3 times a day as needed for anxiety., Disp: 21 tablet, Rfl: 0    meloxicam (Mobic) 7.5 mg tablet, Take 1 tablet (7.5 mg) by mouth once daily., Disp: 90 tablet, Rfl: 3    metoprolol succinate XL (Toprol-XL) 25 mg 24 hr tablet, TAKE ONE TABLET BY MOUTH DAILY, Disp: 90 tablet, Rfl: 3    tirzepatide (Mounjaro) 12.5 mg/0.5 mL pen injector, Inject 12.5 mg under the skin every 7 days., Disp: 2 mL, Rfl: 5    traZODone (Desyrel) 50 mg tablet, Take 1 tablet (50 mg) by mouth as needed at bedtime for sleep., Disp: 90 tablet, Rfl: 0    triamcinolone (Kenalog) 0.1 % ointment, Apply to affected areas twice a day as needed.  Use a moisturizer in between., Disp: 60 g, Rfl: 0    Objective   /74   Pulse 67   Resp 16   Ht 1.524 m (5')   Wt 87.5 kg (192 lb 12.8 oz)   BMI 37.65 kg/m²     Physical Exam  Cardiovascular:      Rate and Rhythm: Normal rate and regular rhythm.   Pulmonary:      Effort: Pulmonary effort is normal.      Breath sounds: Normal breath sounds.         Assessment/Plan   Problem List Items Addressed This Visit          Mental Health    MDD (recurrent major depressive disorder) in remission (CMS-HCC)     Tried Wellbutrin and felt like it made her worse so she discontinued  Will stop Celexa and begin Lexapro 10mg daily in place of it.   Will send a 30 day script and see how  she feels.          Relevant Medications    escitalopram (Lexapro) 10 mg tablet    Generalized anxiety disorder - Primary     Continue Ativan 0.5mg up to 3 times a day PRN. Does not need a refill at this time.   Can also take Ativan during the day when feeling more anxious.  Trazodone is helping at night. She is only taking 25mg at night. Can take up to 50mg nightly.              Relevant Medications    escitalopram (Lexapro) 10 mg tablet       Will return to see me in April     Scribe Attestation  By signing my name below, IDotty Scribe   attest that this documentation has been prepared under the direction and in the presence of Leighann Thomas DO.

## 2025-02-05 NOTE — ASSESSMENT & PLAN NOTE
Tried Wellbutrin and felt like it made her worse so she discontinued  Will stop Celexa and begin Lexapro 10mg daily in place of it.   Will send a 30 day script and see how she feels.

## 2025-02-05 NOTE — ASSESSMENT & PLAN NOTE
Continue Ativan 0.5mg up to 3 times a day PRN. Does not need a refill at this time.   Can also take Ativan during the day when feeling more anxious.  Trazodone is helping at night. She is only taking 25mg at night. Can take up to 50mg nightly.

## 2025-02-11 DIAGNOSIS — I10 BENIGN ESSENTIAL HYPERTENSION: ICD-10-CM

## 2025-02-11 RX ORDER — METOPROLOL SUCCINATE 25 MG/1
25 TABLET, EXTENDED RELEASE ORAL DAILY
Qty: 90 TABLET | Refills: 3 | Status: SHIPPED | OUTPATIENT
Start: 2025-02-11

## 2025-02-11 NOTE — TELEPHONE ENCOUNTER
Patient called and states that she is almost out of Metoprolol XL 25mg.  She needs a 90 day supply. Call to [facility]    She states that this was supposed to be ordered at her last office visit.      Maria Esther Schuster  [phone]

## 2025-02-17 ENCOUNTER — APPOINTMENT (OUTPATIENT)
Dept: PHARMACY | Facility: HOSPITAL | Age: 70
End: 2025-02-17
Payer: MEDICARE

## 2025-02-17 DIAGNOSIS — E11.65 TYPE 2 DIABETES MELLITUS WITH HYPERGLYCEMIA, WITHOUT LONG-TERM CURRENT USE OF INSULIN: ICD-10-CM

## 2025-02-17 PROCEDURE — RXMED WILLOW AMBULATORY MEDICATION CHARGE

## 2025-02-17 NOTE — PROGRESS NOTES
Patient is sent at the request of Leighann Thomas DO for my opinion regarding Type 2 diabetes.  My final recommendations will be communicated back to the requesting provider by way of shared medical record.    Subjective     Maria Esther Schuster is a 68 y.o. year old female patient with type two diabetes mellitus, class II obesity, hypertension, statin myopathy/statin induced myositis, hyperlipidemia and MDD referred for diabetes medication management.    Patient notes she lost two pounds. Forgot to take dose and ended up taking it that evening, woke up feeling nauseated. Has tolerated further doses without side effects when taking dose in the morning. Reports appetite suppression is more noticeable, but has more hunger in the evenings.     Denies low BG symptoms. Patient self discontinued glipizide.     Weight (goal <175 lbs)  194 lbs 10/30/24  194 lbs 12/23/24  194 lbs 1/20/25  192 lbs 2/17/2025    Past Medical History:  She has a past medical history of Angina pectoris (05/22/2023), Benign essential hypertension (05/22/2023), Coronary artery disease (05/22/2023), Hyperlipidemia (11/10/2023), Muscle pain (05/22/2023), Personal history of other specified conditions, Type 2 diabetes mellitus with hyperglycemia, without long-term current use of insulin (08/29/2023), and Urinary tract infection, site not specified (01/25/2021).    Past Surgical History:  She has a past surgical history that includes Breast biopsy (Right, 11/13/2023); Other surgical history (02/06/2019); Other surgical history (05/11/2022); Other surgical history (05/11/2022); Other surgical history (03/10/2022); Other surgical history (03/10/2022); and CT angio coronary art with heartflow if score >30% (04/12/2022).    Social History:  She reports that she has never smoked. She has never been exposed to tobacco smoke. She has never used smokeless tobacco. She reports that she does not drink alcohol and does not use drugs.    Family History:  Family History    Problem Relation Name Age of Onset    Heart attack Mother      Other (Coronary artery bypass surgery) Father      Diabetes type II Father          With complication, with long term current use of insulin     Allergies:  Statins-hmg-coa reductase inhibitors    Exercise: Walking a mile per day and going to gym 3 days per week for ~30-45 minutes.     Current monitoring regimen:   Patient is using: glucometer  Glucose Monitoring: takes BG every morning and most afternoons      mg/dL 2/17/25    BG averages:  7 day 131 mg/dL  14 day 140 mg/dL  30 day 149 mg/dL  90 day 161 mg/dL    Any episodes of hypoglycemia? no    Adverse Effects:   No reported ADRs during encounter    Objective     Last Recorded Vitals:  BP Readings from Last 6 Encounters:   02/05/25 122/74   01/09/25 118/78   09/05/24 122/72   07/22/24 132/68   06/03/24 151/80   05/16/24 130/65     Wt Readings from Last 6 Encounters:   02/05/25 87.5 kg (192 lb 12.8 oz)   01/09/25 88 kg (194 lb)   09/05/24 86.1 kg (189 lb 14.4 oz)   07/22/24 86.6 kg (191 lb)   06/03/24 88.6 kg (195 lb 6.4 oz)   05/16/24 89.4 kg (197 lb 3.2 oz)     Diabetes Pharmacotherapy:    Mounjaro 12.5 mg once weekly    Previous treatment:  Metformin - diarrhea     Primary Prevention:   - Statin? No  - ACE-I/ARB? Yes  - Aspirin? No    Pertinent PMH Review:  - PMH of Pancreatitis: No  - PMH of Retinopathy: No  - PMH of Urinary Tract Infections: Yes   - PMH or FH of MEN Type II/MTC: No    Lab Review  Lab Results   Component Value Date    BILITOT 0.5 01/02/2025    CALCIUM 9.2 01/02/2025    CO2 28 01/02/2025     01/02/2025    CREATININE 0.87 01/02/2025    GLUCOSE 171 (H) 01/02/2025    ALKPHOS 70 01/02/2025    K 4.9 01/02/2025    PROT 6.7 01/02/2025     01/02/2025    AST 17 01/02/2025    ALT 13 01/02/2025    BUN 20 01/02/2025    ANIONGAP 12 01/02/2025    ALBUMIN 4.4 01/02/2025    GFRF >90 05/17/2023     Lab Results   Component Value Date    TRIG 131 01/02/2025    CHOL 224 (H)  "01/02/2025    LDLCALC 144 (H) 01/02/2025    HDL 53.5 01/02/2025     Lab Results   Component Value Date    HGBA1C 6.9 (H) 01/02/2025    HGBA1C 7.1 (A) 09/05/2024    HGBA1C 6.7 (H) 05/07/2024     Component      Latest Ref Rng 11/2/2023   Albumin, Urine Random      Not established mg/L 7.0    Creatinine, Urine Random      20.0 - 320.0 mg/dL 135.1    Albumin/Creatine Ratio      <30.0 ug/mg Creat 5.2      No components found for: \"UACR\"  The 10-year ASCVD risk score (Jose Alfredo GARDNER, et al., 2019) is: 16.5%    Values used to calculate the score:      Age: 69 years      Sex: Female      Is Non- : No      Diabetic: Yes      Tobacco smoker: No      Systolic Blood Pressure: 122 mmHg      Is BP treated: Yes      HDL Cholesterol: 53.5 mg/dL      Total Cholesterol: 224 mg/dL    Health Maintenance:   Urine Albumin: 11/2/2023 - WNL     Drug Interactions:  Glipizide + Ozempic: Glucagon-Like Peptide-1 Agonists may enhance the hypoglycemic effect of Sulfonylureas    Assessment/Plan   Problem List Items Addressed This Visit       Type 2 diabetes mellitus with hyperglycemia, without long-term current use of insulin (Chronic)    Relevant Orders    Referral to Clinical Pharmacy     Other Visit Diagnoses       BMI 38.0-38.9,adult        Relevant Orders    Referral to Clinical Pharmacy        Patients diabetes is borderline controlled with most recent A1c of 6.9% (Goal < 7%). Though A1c is borderline and patient self discontinued CASTELLANOS, average BG numbers appear to have improved with previous GLP-1 dose increase. Will continue current therapy and stop glipizide.    Continue:   Mounjaro 12.5 mg once weekly  Patient notably statin, ezetimibe, and PCSK9 intolerant   Patient Assistance valid through 4/30/25  Follow-up: 3/17/2025 at 9:40 am  PCP Follow-Up: 4/10/25    Michael Brooke PharmD    Continue all meds under the continuation of care with the referring provider and clinical pharmacy team.  "

## 2025-02-18 ENCOUNTER — PHARMACY VISIT (OUTPATIENT)
Dept: PHARMACY | Facility: CLINIC | Age: 70
End: 2025-02-18
Payer: MEDICARE

## 2025-03-11 PROCEDURE — RXMED WILLOW AMBULATORY MEDICATION CHARGE

## 2025-03-14 ENCOUNTER — PHARMACY VISIT (OUTPATIENT)
Dept: PHARMACY | Facility: CLINIC | Age: 70
End: 2025-03-14
Payer: MEDICARE

## 2025-03-17 ENCOUNTER — APPOINTMENT (OUTPATIENT)
Dept: PHARMACY | Facility: HOSPITAL | Age: 70
End: 2025-03-17
Payer: MEDICARE

## 2025-03-17 DIAGNOSIS — E11.65 TYPE 2 DIABETES MELLITUS WITH HYPERGLYCEMIA, WITHOUT LONG-TERM CURRENT USE OF INSULIN: ICD-10-CM

## 2025-03-17 NOTE — PROGRESS NOTES
Patient is sent at the request of Leighann Thomas DO for my opinion regarding Type 2 diabetes.  My final recommendations will be communicated back to the requesting provider by way of shared medical record.    Subjective     Maria Esther Schuster is a 68 y.o. year old female patient with type two diabetes mellitus, class II obesity, hypertension, statin myopathy/statin induced myositis, hyperlipidemia and MDD referred for diabetes medication management.    Patient has been eating more pretzels and chocolate which she attributes to rise in BG. She has now tossed them out to improve her diet. Denies hypoglycemia. Reports some stomach cramps with poor food choices.     Weight (goal <175 lbs)  194 lbs 10/30/24  194 lbs 12/23/24  194 lbs 1/20/25  192 lbs 2/17/2025  194 lbs 3/17/2025    Past Medical History:  She has a past medical history of Angina pectoris (05/22/2023), Benign essential hypertension (05/22/2023), Coronary artery disease (05/22/2023), Hyperlipidemia (11/10/2023), Muscle pain (05/22/2023), Personal history of other specified conditions, Type 2 diabetes mellitus with hyperglycemia, without long-term current use of insulin (08/29/2023), and Urinary tract infection, site not specified (01/25/2021).    Past Surgical History:  She has a past surgical history that includes Breast biopsy (Right, 11/13/2023); Other surgical history (02/06/2019); Other surgical history (05/11/2022); Other surgical history (05/11/2022); Other surgical history (03/10/2022); Other surgical history (03/10/2022); and CT angio coronary art with heartflow if score >30% (04/12/2022).    Social History:  She reports that she has never smoked. She has never been exposed to tobacco smoke. She has never used smokeless tobacco. She reports that she does not drink alcohol and does not use drugs.    Family History:  Family History   Problem Relation Name Age of Onset    Heart attack Mother      Other (Coronary artery bypass surgery) Father      Diabetes  type II Father          With complication, with long term current use of insulin     Allergies:  Statins-hmg-coa reductase inhibitors    Exercise: Walking a mile per day and going to gym 3 days per week for ~30-45 minutes.     Current monitoring regimen:   Patient is using: glucometer  Glucose Monitoring: takes BG every morning and most afternoons     BG averages:  7 day 181 mg/dL  14 day 174 mg/dL  30 day 155 mg/dL  90 day 154 mg/dL    Any episodes of hypoglycemia? no    Adverse Effects:   No reported ADRs during encounter     Patient Assistance Screening (VAF)    Patient verbally reports monthly or yearly income which is less than 400% federal poverty level    Application for program has been submitted for the following medications: Mounjaro    Patient has been informed that program team will be reaching out to them to discuss necessary documentation, instructed to answer phone/return voicemail.     Patient aware this process may take up to 6 weeks.     If approved medication must be filled through [facility] pharmacy and may be picked up or mailed to patient.     Objective     Last Recorded Vitals:  BP Readings from Last 6 Encounters:   02/05/25 122/74   01/09/25 118/78   09/05/24 122/72   07/22/24 132/68   06/03/24 151/80   05/16/24 130/65     Wt Readings from Last 6 Encounters:   02/05/25 87.5 kg (192 lb 12.8 oz)   01/09/25 88 kg (194 lb)   09/05/24 86.1 kg (189 lb 14.4 oz)   07/22/24 86.6 kg (191 lb)   06/03/24 88.6 kg (195 lb 6.4 oz)   05/16/24 89.4 kg (197 lb 3.2 oz)     Diabetes Pharmacotherapy:    Mounjaro 12.5 mg once weekly    Previous treatment:  Metformin - diarrhea     Primary Prevention:   - Statin? No  - ACE-I/ARB? Yes  - Aspirin? No    Pertinent PMH Review:  - PMH of Pancreatitis: No  - PMH of Retinopathy: No  - PMH of Urinary Tract Infections: Yes   - PMH or FH of MEN Type II/MTC: No    Lab Review  Lab Results   Component Value Date    BILITOT 0.5 01/02/2025    CALCIUM 9.2 01/02/2025    CO2 28  "01/02/2025     01/02/2025    CREATININE 0.87 01/02/2025    GLUCOSE 171 (H) 01/02/2025    ALKPHOS 70 01/02/2025    K 4.9 01/02/2025    PROT 6.7 01/02/2025     01/02/2025    AST 17 01/02/2025    ALT 13 01/02/2025    BUN 20 01/02/2025    ANIONGAP 12 01/02/2025    ALBUMIN 4.4 01/02/2025    GFRF >90 05/17/2023     Lab Results   Component Value Date    TRIG 131 01/02/2025    CHOL 224 (H) 01/02/2025    LDLCALC 144 (H) 01/02/2025    HDL 53.5 01/02/2025     Lab Results   Component Value Date    HGBA1C 6.9 (H) 01/02/2025    HGBA1C 7.1 (A) 09/05/2024    HGBA1C 6.7 (H) 05/07/2024     Component      Latest Ref Rng 11/2/2023   Albumin, Urine Random      Not established mg/L 7.0    Creatinine, Urine Random      20.0 - 320.0 mg/dL 135.1    Albumin/Creatine Ratio      <30.0 ug/mg Creat 5.2      No components found for: \"UACR\"  The 10-year ASCVD risk score (Jose Alfredo GARDNER, et al., 2019) is: 16.5%    Values used to calculate the score:      Age: 69 years      Sex: Female      Is Non- : No      Diabetic: Yes      Tobacco smoker: No      Systolic Blood Pressure: 122 mmHg      Is BP treated: Yes      HDL Cholesterol: 53.5 mg/dL      Total Cholesterol: 224 mg/dL    Health Maintenance:   Urine Albumin: 11/2/2023 - WNL     Drug Interactions:  Glipizide + Ozempic: Glucagon-Like Peptide-1 Agonists may enhance the hypoglycemic effect of Sulfonylureas    Assessment/Plan   Problem List Items Addressed This Visit       Type 2 diabetes mellitus with hyperglycemia, without long-term current use of insulin (Chronic)    Relevant Orders    Referral to Clinical Pharmacy     Other Visit Diagnoses       BMI 38.0-38.9,adult        Relevant Orders    Referral to Clinical Pharmacy        Patients diabetes is borderline controlled with most recent A1c of 6.9% (Goal < 7%). BG averages have risen d/t diet. Encouraged patient to reduce carbohydrate intake (chocolate and pretzels). Would prefer to increase GLP-1 dose today but " patient recently filled a 84 days supply. Will schedule follow-up prior to next fill to consider a dose increase.     Continue:   Mounjaro 12.5 mg once weekly  Patient notably statin, ezetimibe, and PCSK9 intolerant  [facility] Patient Assistance valid through 4/30/25  Patient education: reviewed MOA and side effects of GLP-1 therapy  Follow-up: 5/19/2025 at 9:40 am  PCP Follow-Up: 4/10/25    Michael Brooke PharmD    Continue all meds under the continuation of care with the referring provider and clinical pharmacy team.

## 2025-03-28 ENCOUNTER — TELEPHONE (OUTPATIENT)
Dept: PHARMACY | Facility: HOSPITAL | Age: 70
End: 2025-03-28
Payer: MEDICARE

## 2025-03-28 NOTE — TELEPHONE ENCOUNTER
Patient Assistance Program Approval:     We are pleased to inform you that your application for assistance has been approved.     This approval is valid through  3/27/2026  as long as the following criteria continue to be satisfied:     Your medication (Mounjaro) remains covered under your current insurance plan.   Your prescriber does not discontinue therapy.   You do not seek reimbursement from any other private or government-funded programs for the  medication.    Under this program, the pharmacy will first bill your insurance plan for your indemnified specified medication. The Transmension Assistance Fund will then offset your copay balance, so that your out-of pocket expense for your specialty medication will be $0.00.    Michael Brooke, PharmD

## 2025-04-04 ENCOUNTER — TELEMEDICINE (OUTPATIENT)
Dept: PHARMACY | Facility: HOSPITAL | Age: 70
End: 2025-04-04
Payer: MEDICARE

## 2025-04-04 DIAGNOSIS — E11.65 TYPE 2 DIABETES MELLITUS WITH HYPERGLYCEMIA, WITHOUT LONG-TERM CURRENT USE OF INSULIN: ICD-10-CM

## 2025-04-04 LAB
EST. AVERAGE GLUCOSE BLD GHB EST-MCNC: 166 MG/DL
EST. AVERAGE GLUCOSE BLD GHB EST-SCNC: 9.2 MMOL/L
HBA1C MFR BLD: 7.4 % OF TOTAL HGB
T4 FREE SERPL-MCNC: 1.2 NG/DL (ref 0.8–1.8)
TSH SERPL-ACNC: 2.31 MIU/L (ref 0.4–4.5)

## 2025-04-04 RX ORDER — TIRZEPATIDE 15 MG/.5ML
15 INJECTION, SOLUTION SUBCUTANEOUS
Qty: 6 ML | Refills: 1 | Status: SHIPPED | OUTPATIENT
Start: 2025-04-04

## 2025-04-04 NOTE — PROGRESS NOTES
Patient is sent at the request of Leighann Thomas DO for my opinion regarding Type 2 diabetes.  My final recommendations will be communicated back to the requesting provider by way of shared medical record.    Subjective     Maria Esther Schuster is a 68 y.o. year old female patient with type two diabetes mellitus, class II obesity, hypertension, statin myopathy/statin induced myositis, hyperlipidemia and MDD referred for diabetes medication management.    Patient called reporting her A1c has gone up and she has gained a pound. Patient reports her diet has been slightly better but she is still having snacks.     Skipping breakfast. In the evening, she has the most cravings. Trying to opt for lower sugar/carbohydrate options. She has stopped buying chips.     Weight (goal <175 lbs)  194 lbs 10/30/24  194 lbs 12/23/24  194 lbs 1/20/25  192 lbs 2/17/2025  194 lbs 3/17/2025  195 lbs 4/4/2025    Past Medical History:  She has a past medical history of Angina pectoris (05/22/2023), Benign essential hypertension (05/22/2023), Coronary artery disease (05/22/2023), Hyperlipidemia (11/10/2023), Muscle pain (05/22/2023), Personal history of other specified conditions, Type 2 diabetes mellitus with hyperglycemia, without long-term current use of insulin (08/29/2023), and Urinary tract infection, site not specified (01/25/2021).    Past Surgical History:  She has a past surgical history that includes Breast biopsy (Right, 11/13/2023); Other surgical history (02/06/2019); Other surgical history (05/11/2022); Other surgical history (05/11/2022); Other surgical history (03/10/2022); Other surgical history (03/10/2022); and CT angio coronary art with heartflow if score >30% (04/12/2022).    Social History:  She reports that she has never smoked. She has never been exposed to tobacco smoke. She has never used smokeless tobacco. She reports that she does not drink alcohol and does not use drugs.    Family History:  Family History   Problem  Relation Name Age of Onset    Heart attack Mother      Other (Coronary artery bypass surgery) Father      Diabetes type II Father          With complication, with long term current use of insulin     Allergies:  Statins-hmg-coa reductase inhibitors    Exercise: Walking a mile per day and going to gym 3 days per week for ~30-45 minutes.     Current monitoring regimen:   Patient is using: glucometer  Glucose Monitoring: takes BG every morning and most afternoons     Any episodes of hypoglycemia? no    Adverse Effects:   No reported ADRs during encounter    Objective     Last Recorded Vitals:  BP Readings from Last 6 Encounters:   02/05/25 122/74   01/09/25 118/78   09/05/24 122/72   07/22/24 132/68   06/03/24 151/80   05/16/24 130/65     Wt Readings from Last 6 Encounters:   02/05/25 87.5 kg (192 lb 12.8 oz)   01/09/25 88 kg (194 lb)   09/05/24 86.1 kg (189 lb 14.4 oz)   07/22/24 86.6 kg (191 lb)   06/03/24 88.6 kg (195 lb 6.4 oz)   05/16/24 89.4 kg (197 lb 3.2 oz)     Diabetes Pharmacotherapy:    Mounjaro 12.5 mg once weekly    Previous treatment:  Metformin - diarrhea     Primary Prevention:   - Statin? No  - ACE-I/ARB? Yes  - Aspirin? No    Pertinent PMH Review:  - PMH of Pancreatitis: No  - PMH of Retinopathy: No  - PMH of Urinary Tract Infections: Yes   - PMH or FH of MEN Type II/MTC: No    Lab Review  Lab Results   Component Value Date    BILITOT 0.5 01/02/2025    CALCIUM 9.2 01/02/2025    CO2 28 01/02/2025     01/02/2025    CREATININE 0.87 01/02/2025    GLUCOSE 171 (H) 01/02/2025    ALKPHOS 70 01/02/2025    K 4.9 01/02/2025    PROT 6.7 01/02/2025     01/02/2025    AST 17 01/02/2025    ALT 13 01/02/2025    BUN 20 01/02/2025    ANIONGAP 12 01/02/2025    ALBUMIN 4.4 01/02/2025    GFRF >90 05/17/2023     Lab Results   Component Value Date    TRIG 131 01/02/2025    CHOL 224 (H) 01/02/2025    LDLCALC 144 (H) 01/02/2025    HDL 53.5 01/02/2025     Lab Results   Component Value Date    HGBA1C 7.4 (H)  "04/03/2025    HGBA1C 6.9 (H) 01/02/2025    HGBA1C 7.1 (A) 09/05/2024     Component      Latest Ref Rng 11/2/2023   Albumin, Urine Random      Not established mg/L 7.0    Creatinine, Urine Random      20.0 - 320.0 mg/dL 135.1    Albumin/Creatine Ratio      <30.0 ug/mg Creat 5.2      No components found for: \"UACR\"  The 10-year ASCVD risk score (Jose Alfredo GARDNER, et al., 2019) is: 16.5%    Values used to calculate the score:      Age: 69 years      Sex: Female      Is Non- : No      Diabetic: Yes      Tobacco smoker: No      Systolic Blood Pressure: 122 mmHg      Is BP treated: Yes      HDL Cholesterol: 53.5 mg/dL      Total Cholesterol: 224 mg/dL    Health Maintenance:   Urine Albumin: 11/2/2023 - WNL     Drug Interactions:  Glipizide + Ozempic: Glucagon-Like Peptide-1 Agonists may enhance the hypoglycemic effect of Sulfonylureas    Assessment/Plan   Problem List Items Addressed This Visit       Type 2 diabetes mellitus with hyperglycemia, without long-term current use of insulin (Chronic)    Relevant Medications    tirzepatide (Mounjaro) 15 mg/0.5 mL pen injector    Other Relevant Orders    Referral to Clinical Pharmacy     Other Visit Diagnoses       BMI 38.0-38.9,adult        Relevant Orders    Referral to Clinical Pharmacy        Patients diabetes is borderline controlled with most recent A1c of 7.4% (Goal < 7%). Likely cause of A1c increase is diet. Discussed options for reducing carbohydrates. Plan to restart low dose of CASTELLANOS for the time being. Patient just refilled 3 month supply of Mounjaro 12.5 mg. Will send in increased dose of GLP-1 for when she is ready for refill.   Increase:   Mounjaro 15 mg once weekly, after completing current supply of Mounjaro 12.5 mg once weekly  Restart:  Glipizide 2.5 mg once daily with largest meal of the day  Patient notably statin, ezetimibe, and PCSK9 intolerant  [facility] Patient Assistance valid through 3/27/2026  Follow-up: 5/19/2025 at 9:40 am  PCP Follow-Up: " 4/10/25    Michael Brooke, PharmD    Continue all meds under the continuation of care with the referring provider and clinical pharmacy team.

## 2025-04-08 NOTE — ASSESSMENT & PLAN NOTE
Clinically euthyroid   Remain on Levothyroxine 50 mcg daily for now.  Will send refill with increase to 75 mcg daily to see if this will help with her weight loss to start after she finishes her 50 mcg rx.  Will repeat TSH in 6 months.

## 2025-04-08 NOTE — ASSESSMENT & PLAN NOTE
Continue Lorazepam 0.5mg up to 3 times a day PRN.  Can also take Lorazepam during the day when feeling more anxious.  I have personally reviewed the OARRS report for this patient. I have considered the risks of abuse, dependence, addiction and diversion. I believe that it is clinically appropriate for this patient to be prescribed this medication.   Last refilled 1/30/25 #21 tablets no refills.

## 2025-04-08 NOTE — ASSESSMENT & PLAN NOTE
Following with clinical pharmacistMichael.  A1c risen from 6.9 to 7.4%  Remain on Tirzepatide 12.5 mg weekly and Glipizide 2.5 mg daily for now.  Has two more months left of 12.5 mg dose then will be moving up to 15 mg.

## 2025-04-08 NOTE — PROGRESS NOTES
Subjective   Patient ID: Maria Esther Schuster is a 69 y.o. female who presents for follow up.    HPI     She is doing well today.    Following with clinical pharmacist, Michael for DM.   Has two more months left of 12.5 mg dose then will be moving up to 15 mg.   Now taking 1/2 tablet of Glipizide 5 mg so is 2.5 mg  No longer eating chips.  Needs to start going back to the gym.  Will be starting to work in her garden again as the weather improves.    Changed from Celexa to Escitalopram at last visit.   She is doing better and needed a refill.   Things with son's estate are going better.  Dealing with his taxes right now.     Had measles vaccine as a child and curious if she needs the booster.  Spoke with her today and if she is regularly around little kids I would recommend getting it.     Had shingles a few years ago under her breast.  Never had shingles vaccine.    Reviewed labs today.   Latest Reference Range & Units 01/02/25 11:33 04/03/25 11:17   Hemoglobin A1C See comment % 6.9 (H)    HEMOGLOBIN A1c <5.7 % of total Hgb  7.4 (H)   Estimated Average Glucose Not Established mg/dL 151    eAG (mg/dL) mg/dL  166   eAG (mmol/L) mmol/L  9.2   Thyroxine, Free 0.78 - 1.48 ng/dL 1.11    T4, FREE 0.8 - 1.8 ng/dL  1.2   Thyroid Stimulating Hormone 0.44 - 3.98 mIU/L 5.62 (H)    TSH 0.40 - 4.50 mIU/L  2.31   GLUCOSE 74 - 99 mg/dL 171 (H)    (H): Data is abnormally high    Review of Systems   Respiratory:  Negative for shortness of breath.    Cardiovascular:  Negative for chest pain and palpitations.         Current Outpatient Medications:     aspirin 81 mg EC tablet, Take 1 tablet (81 mg) by mouth once daily., Disp: , Rfl:     blood sugar diagnostic (OneTouch Verio test strips) strip, check sugar three times a day, in morning, before dinner and before bedtime, Disp: , Rfl:     co-enzyme Q-10 30 mg capsule, Take 1 capsule (30 mg) by mouth once daily., Disp: , Rfl:     escitalopram (Lexapro) 10 mg tablet, Take 1 tablet (10 mg) by mouth  once daily., Disp: 90 tablet, Rfl: 3    hydrocortisone 2.5 % cream, APPLY GENEROUSLY TO AFFECTED AREAS BEFORE AND AFTER SWIMMING., Disp: , Rfl:     levothyroxine (Synthroid, Levoxyl) 75 mcg tablet, Take 1 tablet (75 mcg) by mouth early in the morning.. Take on an empty stomach at the same time each day, either 30 to 60 minutes prior to breakfast NEW DOSE, Disp: 90 tablet, Rfl: 1    lisinopril 20 mg tablet, Take 1 tablet (20 mg) by mouth once daily., Disp: 90 tablet, Rfl: 3    LORazepam (Ativan) 0.5 mg tablet, One half or one tablet up to 3 times a day as needed for anxiety., Disp: 21 tablet, Rfl: 0    meloxicam (Mobic) 7.5 mg tablet, Take 1 tablet (7.5 mg) by mouth once daily., Disp: 90 tablet, Rfl: 3    metoprolol succinate XL (Toprol-XL) 25 mg 24 hr tablet, Take 1 tablet (25 mg) by mouth once daily., Disp: 90 tablet, Rfl: 3    tirzepatide (Mounjaro) 15 mg/0.5 mL pen injector, Inject 15 mg under the skin every 7 days., Disp: 6 mL, Rfl: 1    traZODone (Desyrel) 50 mg tablet, Take 1 tablet (50 mg) by mouth as needed at bedtime for sleep., Disp: 90 tablet, Rfl: 0    triamcinolone (Kenalog) 0.1 % ointment, Apply to affected areas twice a day as needed.  Use a moisturizer in between., Disp: 60 g, Rfl: 0    Objective   /58   Pulse 76   Resp 16   Ht 1.524 m (5')   Wt 89.6 kg (197 lb 9.6 oz)   LMP  (LMP Unknown)   BMI 38.59 kg/m²     Physical Exam  Constitutional:       Appearance: Normal appearance.   Eyes:      Conjunctiva/sclera: Conjunctivae normal.      Pupils: Pupils are equal, round, and reactive to light.   Cardiovascular:      Rate and Rhythm: Normal rate and regular rhythm.      Heart sounds: Normal heart sounds.   Pulmonary:      Effort: Pulmonary effort is normal.      Breath sounds: Normal breath sounds.   Lymphadenopathy:      Cervical: No cervical adenopathy.         Assessment/Plan   Problem List Items Addressed This Visit          Cardiac and Vasculature    Benign essential hypertension -  Primary (Chronic)     Controlled today  Remain on Metoprolol 25 mg daily and Lisinopril 20 mg daily.             Endocrine/Metabolic    Type 2 diabetes mellitus with hyperglycemia, without long-term current use of insulin (Chronic)     Following with clinical pharmacist, Michael.  A1c risen from 6.9 to 7.4%  Remain on Tirzepatide 12.5 mg weekly and Glipizide 2.5 mg daily for now.  Has two more months left of 12.5 mg dose then will be moving up to 15 mg.   Encouraged to exercise and be more active in spring.        Hypothyroidism due to Hashimoto's thyroiditis     Clinically euthyroid   Remain on Levothyroxine 50 mcg daily for now.  Will send refill with increase to 75 mcg daily to see if this will help with her weight loss to start after she finishes her 50 mcg rx.  Will repeat TSH in 6 months.          Relevant Medications    levothyroxine (Synthroid, Levoxyl) 75 mcg tablet       Mental Health    MDD (recurrent major depressive disorder) in remission ([facility])     Mood has improved from last visit.  Remain on Escitalopram  10 mg daily          Relevant Medications    escitalopram (Lexapro) 10 mg tablet        ADOLFO     Relevant Medications    escitalopram (Lexapro) 10 mg tablet       Sleep    Insomnia due to psychological stress     Trazodone is helping at night.   She is only taking 25mg at night.   Can take up to 50mg nightly.            Please return to see me in 3 months for a 30 minute follow up with fingerstick A1c.    Scribe Attestation  By signing my name below, IDotty Scribe   attest that this documentation has been prepared under the direction and in the presence of Leighann Thomas DO.

## 2025-04-09 ENCOUNTER — HOSPITAL ENCOUNTER (OUTPATIENT)
Dept: RADIOLOGY | Facility: CLINIC | Age: 70
Discharge: HOME | End: 2025-04-09
Payer: MEDICARE

## 2025-04-09 VITALS — BODY MASS INDEX: 37.69 KG/M2 | WEIGHT: 193 LBS

## 2025-04-09 DIAGNOSIS — Z00.00 HEALTHCARE MAINTENANCE: ICD-10-CM

## 2025-04-09 PROCEDURE — 77063 BREAST TOMOSYNTHESIS BI: CPT | Performed by: RADIOLOGY

## 2025-04-09 PROCEDURE — 77067 SCR MAMMO BI INCL CAD: CPT | Performed by: RADIOLOGY

## 2025-04-09 PROCEDURE — 77067 SCR MAMMO BI INCL CAD: CPT

## 2025-04-10 ENCOUNTER — APPOINTMENT (OUTPATIENT)
Dept: PRIMARY CARE | Facility: CLINIC | Age: 70
End: 2025-04-10
Payer: MEDICARE

## 2025-04-10 VITALS
HEART RATE: 76 BPM | RESPIRATION RATE: 16 BRPM | SYSTOLIC BLOOD PRESSURE: 103 MMHG | BODY MASS INDEX: 38.79 KG/M2 | DIASTOLIC BLOOD PRESSURE: 58 MMHG | HEIGHT: 60 IN | WEIGHT: 197.6 LBS

## 2025-04-10 DIAGNOSIS — F51.02 INSOMNIA DUE TO PSYCHOLOGICAL STRESS: ICD-10-CM

## 2025-04-10 DIAGNOSIS — F41.1 GENERALIZED ANXIETY DISORDER: ICD-10-CM

## 2025-04-10 DIAGNOSIS — F33.40 MDD (RECURRENT MAJOR DEPRESSIVE DISORDER) IN REMISSION (CMS-HCC): ICD-10-CM

## 2025-04-10 DIAGNOSIS — I10 BENIGN ESSENTIAL HYPERTENSION: Primary | Chronic | ICD-10-CM

## 2025-04-10 DIAGNOSIS — E11.65 TYPE 2 DIABETES MELLITUS WITH HYPERGLYCEMIA, WITHOUT LONG-TERM CURRENT USE OF INSULIN: Chronic | ICD-10-CM

## 2025-04-10 DIAGNOSIS — E06.3 HYPOTHYROIDISM DUE TO HASHIMOTO'S THYROIDITIS: ICD-10-CM

## 2025-04-10 PROCEDURE — 3074F SYST BP LT 130 MM HG: CPT | Performed by: INTERNAL MEDICINE

## 2025-04-10 PROCEDURE — 1126F AMNT PAIN NOTED NONE PRSNT: CPT | Performed by: INTERNAL MEDICINE

## 2025-04-10 PROCEDURE — 1123F ACP DISCUSS/DSCN MKR DOCD: CPT | Performed by: INTERNAL MEDICINE

## 2025-04-10 PROCEDURE — 3050F LDL-C >= 130 MG/DL: CPT | Performed by: INTERNAL MEDICINE

## 2025-04-10 PROCEDURE — 1036F TOBACCO NON-USER: CPT | Performed by: INTERNAL MEDICINE

## 2025-04-10 PROCEDURE — 1158F ADVNC CARE PLAN TLK DOCD: CPT | Performed by: INTERNAL MEDICINE

## 2025-04-10 PROCEDURE — 3008F BODY MASS INDEX DOCD: CPT | Performed by: INTERNAL MEDICINE

## 2025-04-10 PROCEDURE — 3044F HG A1C LEVEL LT 7.0%: CPT | Performed by: INTERNAL MEDICINE

## 2025-04-10 PROCEDURE — 4010F ACE/ARB THERAPY RXD/TAKEN: CPT | Performed by: INTERNAL MEDICINE

## 2025-04-10 PROCEDURE — 99214 OFFICE O/P EST MOD 30 MIN: CPT | Performed by: INTERNAL MEDICINE

## 2025-04-10 PROCEDURE — 3062F POS MACROALBUMINURIA REV: CPT | Performed by: INTERNAL MEDICINE

## 2025-04-10 PROCEDURE — 3078F DIAST BP <80 MM HG: CPT | Performed by: INTERNAL MEDICINE

## 2025-04-10 RX ORDER — LEVOTHYROXINE SODIUM 75 UG/1
75 TABLET ORAL DAILY
Qty: 90 TABLET | Refills: 1 | Status: SHIPPED | OUTPATIENT
Start: 2025-04-10

## 2025-04-10 RX ORDER — ESCITALOPRAM OXALATE 10 MG/1
10 TABLET ORAL DAILY
Qty: 90 TABLET | Refills: 3 | Status: SHIPPED | OUTPATIENT
Start: 2025-04-10

## 2025-04-10 ASSESSMENT — ENCOUNTER SYMPTOMS
SHORTNESS OF BREATH: 0
PALPITATIONS: 0

## 2025-04-10 ASSESSMENT — PAIN SCALES - GENERAL: PAINLEVEL_OUTOF10: 0-NO PAIN

## 2025-04-10 NOTE — PATIENT INSTRUCTIONS
I recommend that you get a Measles booster (MMR)  from your pharmacy .   May have fever and sore arm.     You can still consider the shingles  vaccine  series of two shots.     We increased your levothyroxine dose to 75 mcg.  I sent in this dose for you to start when you are done with the 50's.    Stay on that half tab glipizide.     See me again in 3 mo for 30 min visit.   We can check your A1c with a fingerstick at your next visit.

## 2025-05-02 ENCOUNTER — TELEPHONE (OUTPATIENT)
Dept: PRIMARY CARE | Facility: CLINIC | Age: 70
End: 2025-05-02
Payer: MEDICARE

## 2025-05-02 DIAGNOSIS — F33.40 MDD (RECURRENT MAJOR DEPRESSIVE DISORDER) IN REMISSION (CMS-HCC): ICD-10-CM

## 2025-05-02 DIAGNOSIS — F41.1 GENERALIZED ANXIETY DISORDER: ICD-10-CM

## 2025-05-02 RX ORDER — ESCITALOPRAM OXALATE 10 MG/1
10 TABLET ORAL DAILY
Qty: 90 TABLET | Refills: 3 | Status: SHIPPED | OUTPATIENT
Start: 2025-05-02

## 2025-05-02 NOTE — TELEPHONE ENCOUNTER
Patient LMOM for refill of     Escitalopram 10 mg tablet   Take 1 tablet by mouth once daily  Q-90 R-0    Maria Esther   [facility]

## 2025-05-19 ENCOUNTER — TELEMEDICINE (OUTPATIENT)
Dept: PHARMACY | Facility: HOSPITAL | Age: 70
End: 2025-05-19
Payer: MEDICARE

## 2025-05-19 ENCOUNTER — APPOINTMENT (OUTPATIENT)
Dept: PHARMACY | Facility: HOSPITAL | Age: 70
End: 2025-05-19
Payer: MEDICARE

## 2025-05-19 DIAGNOSIS — E11.65 TYPE 2 DIABETES MELLITUS WITH HYPERGLYCEMIA, WITHOUT LONG-TERM CURRENT USE OF INSULIN: ICD-10-CM

## 2025-05-19 NOTE — PROGRESS NOTES
Patient is sent at the request of Leighann Thomas DO for my opinion regarding Type 2 diabetes.  My final recommendations will be communicated back to the requesting provider by way of shared medical record.    Subjective     Maria Esther Schuster is a 68 y.o. year old female patient with type two diabetes mellitus, class II obesity, hypertension, statin myopathy/statin induced myositis, hyperlipidemia and MDD referred for diabetes medication management.    Patient has lost some weight and is seeing an improvement in her BG numbers. Still on 12.5 mg Mounjaro dose. Has noticed some changes in her diet with the medications. Decreased portions at night and has been working in the yard more often.     Weight (goal <175 lbs)  194 lbs 10/30/24  194 lbs 12/23/24  194 lbs 1/20/25  192 lbs 2/17/2025  194 lbs 3/17/2025  195 lbs 4/4/2025  191 lbs 5/19/2025    Past Medical History:  She has a past medical history of Angina pectoris (05/22/2023), Benign essential hypertension (05/22/2023), Coronary artery disease (05/22/2023), Hyperlipidemia (11/10/2023), Muscle pain (05/22/2023), Personal history of other specified conditions, Type 2 diabetes mellitus with hyperglycemia, without long-term current use of insulin (08/29/2023), and Urinary tract infection, site not specified (01/25/2021).    Past Surgical History:  She has a past surgical history that includes Breast biopsy (Right, 11/13/2023); Other surgical history (02/06/2019); Other surgical history (05/11/2022); Other surgical history (05/11/2022); Other surgical history (03/10/2022); Other surgical history (03/10/2022); and CT angio coronary art with heartflow if score >30% (04/12/2022).    Social History:  She reports that she has never smoked. She has never been exposed to tobacco smoke. She has never used smokeless tobacco. She reports that she does not drink alcohol and does not use drugs.    Family History:  Family History   Problem Relation Name Age of Onset    Heart attack  Mother      Other (Coronary artery bypass surgery) Father      Diabetes type II Father          With complication, with long term current use of insulin     Allergies:  Statins-hmg-coa reductase inhibitors    Exercise: Walking a mile per day and going to gym 3 days per week for ~30-45 minutes.     Current monitoring regimen:   Patient is using: glucometer  Glucose Monitoring: takes BG every morning and most afternoons     BG Averages (mg/dL)  7 day average: 145  30 day average: 133  90 day average: 151    Any episodes of hypoglycemia? no    Adverse Effects:   No reported ADRs during encounter    Objective     Last Recorded Vitals:  BP Readings from Last 6 Encounters:   04/10/25 103/58   02/05/25 122/74   01/09/25 118/78   09/05/24 122/72   07/22/24 132/68   06/03/24 151/80     Wt Readings from Last 6 Encounters:   04/10/25 89.6 kg (197 lb 9.6 oz)   04/09/25 87.5 kg (193 lb)   02/05/25 87.5 kg (192 lb 12.8 oz)   01/09/25 88 kg (194 lb)   09/05/24 86.1 kg (189 lb 14.4 oz)   07/22/24 86.6 kg (191 lb)     Diabetes Pharmacotherapy:    Mounjaro 12.5 mg once weekly    Previous treatment:  Metformin - diarrhea     Primary Prevention:   - Statin? No  - ACE-I/ARB? Yes  - Aspirin? No    Pertinent PMH Review:  - PMH of Pancreatitis: No  - PMH of Retinopathy: No  - PMH of Urinary Tract Infections: Yes   - PMH or FH of MEN Type II/MTC: No    Lab Review  Lab Results   Component Value Date    BILITOT 0.5 01/02/2025    CALCIUM 9.2 01/02/2025    CO2 28 01/02/2025     01/02/2025    CREATININE 0.87 01/02/2025    GLUCOSE 171 (H) 01/02/2025    ALKPHOS 70 01/02/2025    K 4.9 01/02/2025    PROT 6.7 01/02/2025     01/02/2025    AST 17 01/02/2025    ALT 13 01/02/2025    BUN 20 01/02/2025    ANIONGAP 12 01/02/2025    ALBUMIN 4.4 01/02/2025    GFRF >90 05/17/2023     Lab Results   Component Value Date    TRIG 131 01/02/2025    CHOL 224 (H) 01/02/2025    LDLCALC 144 (H) 01/02/2025    HDL 53.5 01/02/2025     Lab Results   Component  "Value Date    HGBA1C 7.4 (H) 04/03/2025    HGBA1C 6.9 (H) 01/02/2025    HGBA1C 7.1 (A) 09/05/2024     Component      Latest Ref Rng 11/2/2023   Albumin, Urine Random      Not established mg/L 7.0    Creatinine, Urine Random      20.0 - 320.0 mg/dL 135.1    Albumin/Creatine Ratio      <30.0 ug/mg Creat 5.2      No components found for: \"UACR\"  The 10-year ASCVD risk score (Jose Alfredo GARDNER, et al., 2019) is: 12%    Values used to calculate the score:      Age: 69 years      Sex: Female      Is Non- : No      Diabetic: Yes      Tobacco smoker: No      Systolic Blood Pressure: 103 mmHg      Is BP treated: Yes      HDL Cholesterol: 53.5 mg/dL      Total Cholesterol: 224 mg/dL    Health Maintenance:   Urine Albumin: 11/2/2023 - WNL     Drug Interactions:  Glipizide + Ozempic: Glucagon-Like Peptide-1 Agonists may enhance the hypoglycemic effect of Sulfonylureas    Assessment/Plan   Problem List Items Addressed This Visit       Type 2 diabetes mellitus with hyperglycemia, without long-term current use of insulin (Chronic)    Relevant Orders    Referral to Clinical Pharmacy     Other Visit Diagnoses         BMI 38.0-38.9,adult        Relevant Orders    Referral to Clinical Pharmacy        Patients diabetes is borderline controlled with most recent A1c of 7.4% (Goal < 7%). Patient tolerating current therapy and seeing some weight loss and improvement in BG. Encouraged patient to continue increased physical activity and diet. Patient still using up supply of 12.5 mg Mounjaro, will increase in ~4 weeks.   Increase:   Mounjaro 15 mg once weekly, after completing current supply of Mounjaro 12.5 mg once weekly  Continue:   Glipizide 2.5 mg once daily with largest meal of the day  Patient notably statin, ezetimibe, and PCSK9 intolerant   Patient Assistance valid through 3/27/2026  Follow-up: 7/7/2025 at 9:40 am  PCP Follow-Up: 7/10/25    Michael Brooke, Symone    Continue all meds under the continuation of care " with the referring provider and clinical pharmacy team.

## 2025-05-27 ENCOUNTER — APPOINTMENT (OUTPATIENT)
Dept: PHARMACY | Facility: HOSPITAL | Age: 70
End: 2025-05-27
Payer: MEDICARE

## 2025-05-28 ENCOUNTER — TELEPHONE (OUTPATIENT)
Dept: PRIMARY CARE | Facility: CLINIC | Age: 70
End: 2025-05-28
Payer: MEDICARE

## 2025-05-28 DIAGNOSIS — R30.0 DYSURIA: ICD-10-CM

## 2025-05-28 NOTE — TELEPHONE ENCOUNTER
Patient calling- for 3 days, frequency with pressure, burning, kept her up for one night when first started.   may I put her in today or doctor create order for her to go to lab. Opening today at 1:30pm   Advancement Flap (Single) Text: The defect edges were debeveled with a #15 scalpel blade.  Given the location of the defect and the proximity to free margins a single advancement flap was deemed most appropriate.  Using a sterile surgical marker, an appropriate advancement flap was drawn incorporating the defect and placing the expected incisions within the relaxed skin tension lines where possible.    The area thus outlined was incised deep to adipose tissue with a #15 scalpel blade.  The skin margins were undermined to an appropriate distance in all directions utilizing iris scissors.

## 2025-05-29 ENCOUNTER — TELEPHONE (OUTPATIENT)
Dept: PRIMARY CARE | Facility: CLINIC | Age: 70
End: 2025-05-29
Payer: MEDICARE

## 2025-05-29 DIAGNOSIS — N39.0 ACUTE UTI (URINARY TRACT INFECTION): Primary | ICD-10-CM

## 2025-05-29 RX ORDER — SULFAMETHOXAZOLE AND TRIMETHOPRIM 800; 160 MG/1; MG/1
1 TABLET ORAL 2 TIMES DAILY
Qty: 14 TABLET | Refills: 0 | Status: SHIPPED | OUTPATIENT
Start: 2025-05-29 | End: 2025-06-05

## 2025-05-29 NOTE — TELEPHONE ENCOUNTER
Patient left message,was up all night with urinary pressure, any chance results are back. Also cristiano was terrible. First stated they could not see order, she came up  for paper, took it down, they then stated  they could not send to [facility] had to go to clinic, it did not make sense, and she just wants some relief. Can she PLEASE hear today, or be given something to treat this issue. Please call her at [phone]  There is an abnormal result in chart.

## 2025-05-30 LAB
APPEARANCE UR: ABNORMAL
BACTERIA #/AREA URNS HPF: ABNORMAL /HPF
BACTERIA UR CULT: ABNORMAL
BACTERIA UR CULT: ABNORMAL
BILIRUB UR QL STRIP: NEGATIVE
COLOR UR: YELLOW
GLUCOSE UR QL STRIP: NEGATIVE
HGB UR QL STRIP: ABNORMAL
HYALINE CASTS #/AREA URNS LPF: ABNORMAL /LPF
KETONES UR QL STRIP: NEGATIVE
LEUKOCYTE ESTERASE UR QL STRIP: ABNORMAL
NITRITE UR QL STRIP: POSITIVE
PH UR STRIP: 5.5 [PH] (ref 5–8)
PROT UR QL STRIP: ABNORMAL
RBC #/AREA URNS HPF: ABNORMAL /HPF
SERVICE CMNT-IMP: ABNORMAL
SP GR UR STRIP: 1.01 (ref 1–1.03)
SQUAMOUS #/AREA URNS HPF: ABNORMAL /HPF
WBC #/AREA URNS HPF: ABNORMAL /HPF

## 2025-06-02 ENCOUNTER — TELEPHONE (OUTPATIENT)
Dept: PRIMARY CARE | Facility: CLINIC | Age: 70
End: 2025-06-02
Payer: MEDICARE

## 2025-06-02 NOTE — TELEPHONE ENCOUNTER
I spoke with the patient and she states she do want to bee seen or have something prescribed for sinus and or some thing different for the UTI from last week    She state the bactrim may had given her diarrhea. She said she think this because she said she saw mily on her lab results    The reason for the patient call to day is for the following symptoms:    A productive cough, runny nose, congestion, sob & wheezing when active, malaise. Diarrhea, and vomitting    Patient has not taken a covid test    Patient has taken Tylenol, Severe Sinus, and Advil cold sinus and she state none is working    She said her symptom started last week and became worst on Friday    Please advise

## 2025-06-02 NOTE — TELEPHONE ENCOUNTER
Patient calling- saw she was scheduled for 1:30pm tomorrow. Stated please cancel apt. She went to clinic and was prescribed medication. Visit is in chart..

## 2025-06-02 NOTE — TELEPHONE ENCOUNTER
Patient called and is requesting to be seen today.    She states that she is sick. She had a UTI last week and taking antibiotics for that.  She currently has a cough that kept her up all night, diarrhea, head congestion, no fever.   Has not taken a Covid test.    Maria Esther Schuster  [phone]

## 2025-06-03 ENCOUNTER — APPOINTMENT (OUTPATIENT)
Dept: PRIMARY CARE | Facility: CLINIC | Age: 70
End: 2025-06-03
Payer: MEDICARE

## 2025-06-13 PROCEDURE — RXMED WILLOW AMBULATORY MEDICATION CHARGE

## 2025-06-14 ENCOUNTER — PHARMACY VISIT (OUTPATIENT)
Dept: PHARMACY | Facility: CLINIC | Age: 70
End: 2025-06-14
Payer: MEDICARE

## 2025-06-23 DIAGNOSIS — E11.65 TYPE 2 DIABETES MELLITUS WITH HYPERGLYCEMIA, WITHOUT LONG-TERM CURRENT USE OF INSULIN: ICD-10-CM

## 2025-06-23 DIAGNOSIS — I10 BENIGN ESSENTIAL HYPERTENSION: ICD-10-CM

## 2025-06-23 RX ORDER — LISINOPRIL 20 MG/1
20 TABLET ORAL DAILY
Qty: 90 TABLET | Refills: 3 | Status: SHIPPED | OUTPATIENT
Start: 2025-06-23

## 2025-06-23 NOTE — TELEPHONE ENCOUNTER
Patient LMOM for refill    Lisinopril 20 mg  Take 1 tablet by mouth once daily  R-3 Q-90    [facility]

## 2025-07-07 ENCOUNTER — APPOINTMENT (OUTPATIENT)
Dept: PHARMACY | Facility: HOSPITAL | Age: 70
End: 2025-07-07
Payer: MEDICARE

## 2025-07-10 ENCOUNTER — APPOINTMENT (OUTPATIENT)
Dept: PRIMARY CARE | Facility: CLINIC | Age: 70
End: 2025-07-10
Payer: MEDICARE

## 2025-07-10 VITALS
HEART RATE: 65 BPM | HEIGHT: 60 IN | SYSTOLIC BLOOD PRESSURE: 101 MMHG | BODY MASS INDEX: 36.63 KG/M2 | WEIGHT: 186.6 LBS | RESPIRATION RATE: 18 BRPM | DIASTOLIC BLOOD PRESSURE: 66 MMHG

## 2025-07-10 DIAGNOSIS — F33.40 MDD (RECURRENT MAJOR DEPRESSIVE DISORDER) IN REMISSION: ICD-10-CM

## 2025-07-10 DIAGNOSIS — F51.02 INSOMNIA DUE TO PSYCHOLOGICAL STRESS: ICD-10-CM

## 2025-07-10 DIAGNOSIS — I10 BENIGN ESSENTIAL HYPERTENSION: ICD-10-CM

## 2025-07-10 DIAGNOSIS — Z00.00 ENCOUNTER FOR PREVENTATIVE ADULT HEALTH CARE EXAMINATION: ICD-10-CM

## 2025-07-10 DIAGNOSIS — E11.65 TYPE 2 DIABETES MELLITUS WITH HYPERGLYCEMIA, WITHOUT LONG-TERM CURRENT USE OF INSULIN: Primary | ICD-10-CM

## 2025-07-10 DIAGNOSIS — E06.3 HYPOTHYROIDISM DUE TO HASHIMOTO'S THYROIDITIS: ICD-10-CM

## 2025-07-10 LAB — POC HEMOGLOBIN A1C: 7.1 % (ref 4.2–6.5)

## 2025-07-10 PROCEDURE — 1159F MED LIST DOCD IN RCRD: CPT | Performed by: INTERNAL MEDICINE

## 2025-07-10 PROCEDURE — 3008F BODY MASS INDEX DOCD: CPT | Performed by: INTERNAL MEDICINE

## 2025-07-10 PROCEDURE — 3051F HG A1C>EQUAL 7.0%<8.0%: CPT | Performed by: INTERNAL MEDICINE

## 2025-07-10 PROCEDURE — 99214 OFFICE O/P EST MOD 30 MIN: CPT | Performed by: INTERNAL MEDICINE

## 2025-07-10 PROCEDURE — 83036 HEMOGLOBIN GLYCOSYLATED A1C: CPT | Performed by: INTERNAL MEDICINE

## 2025-07-10 PROCEDURE — 1126F AMNT PAIN NOTED NONE PRSNT: CPT | Performed by: INTERNAL MEDICINE

## 2025-07-10 PROCEDURE — 3078F DIAST BP <80 MM HG: CPT | Performed by: INTERNAL MEDICINE

## 2025-07-10 PROCEDURE — 4010F ACE/ARB THERAPY RXD/TAKEN: CPT | Performed by: INTERNAL MEDICINE

## 2025-07-10 PROCEDURE — 3074F SYST BP LT 130 MM HG: CPT | Performed by: INTERNAL MEDICINE

## 2025-07-10 RX ORDER — TRAZODONE HYDROCHLORIDE 50 MG/1
25 TABLET ORAL NIGHTLY PRN
Qty: 90 TABLET | Refills: 0 | Status: SHIPPED | OUTPATIENT
Start: 2025-07-10 | End: 2026-07-10

## 2025-07-10 ASSESSMENT — ENCOUNTER SYMPTOMS
SHORTNESS OF BREATH: 0
DYSURIA: 0
CONSTIPATION: 1
DIARRHEA: 0
FREQUENCY: 0

## 2025-07-10 ASSESSMENT — PAIN SCALES - GENERAL: PAINLEVEL_OUTOF10: 0-NO PAIN

## 2025-07-10 NOTE — PATIENT INSTRUCTIONS
See me again in November for Medicare Wellness Visit.     We can do another A1c at that visit    You are not due for labs until 2026.

## 2025-07-10 NOTE — PROGRESS NOTES
Subjective   Patient ID: Maria Esther Schuster is a 69 y.o. female who presents for follow up.    HPI     Patient presents today for a follow-up. Feeling well today.    She has lost 7 pounds since last visit; she's walking more, feeling back to herself.    Wearing her CGM device to check her blood sugar. 14 day average 123 and 30 day average 144 from looking at her meter.    Denies SOB, chest pain, diarrhea, urinary symptoms.     Occasional constipation that resolves with Miralax.    She has been treated for a UTI since her last visit.    Latest Reference Range & Units 01/02/25 11:33 04/03/25 11:17 05/28/25 13:09   GLUCOSE 74 - 99 mg/dL 171 (H)     SODIUM 136 - 145 mmol/L 138     POTASSIUM 3.5 - 5.3 mmol/L 4.9     CHLORIDE 98 - 107 mmol/L 103     Bicarbonate 21 - 32 mmol/L 28     Anion Gap 10 - 20 mmol/L 12     Blood Urea Nitrogen 6 - 23 mg/dL 20     Creatinine 0.50 - 1.05 mg/dL 0.87     EGFR >60 mL/min/1.73m*2 73     Calcium 8.6 - 10.6 mg/dL 9.2     Albumin 3.4 - 5.0 g/dL 4.4     Alkaline Phosphatase 33 - 136 U/L 70     ALT 7 - 45 U/L 13     AST 9 - 39 U/L 17     Bilirubin Total 0.0 - 1.2 mg/dL 0.5     HDL CHOLESTEROL mg/dL 53.5     Cholesterol/HDL Ratio  4.2     LDL Calculated <=99 mg/dL 144 (H)     VLDL 0 - 40 mg/dL 26     TRIGLYCERIDES 0 - 149 mg/dL 131     Non HDL Cholesterol 0 - 149 mg/dL 171 (H)     Total Protein 6.4 - 8.2 g/dL 6.7     CHOLESTEROL 0 - 199 mg/dL 224 (H)     Hemoglobin A1C See comment % 6.9 (H)     HEMOGLOBIN A1c <5.7 % of total Hgb  7.4 (H)    Estimated Average Glucose Not Established mg/dL 151     eAG (mg/dL) mg/dL  166    eAG (mmol/L) mmol/L  9.2    Thyroxine, Free 0.78 - 1.48 ng/dL 1.11     T4, FREE 0.8 - 1.8 ng/dL  1.2    Thyroid Stimulating Hormone 0.44 - 3.98 mIU/L 5.62 (H)     TSH 0.40 - 4.50 mIU/L  2.31    CULTURE, URINE, ROUTINE    COMMENT ONLY  SEE NOTE !   Albumin, Urine Random Not established mg/L <7.0     Creatinine, Urine Random 20.0 - 320.0 mg/dL 122.9     Albumin/Creatinine Ratio   COMMENT ONLY     COLOR YELLOW    YELLOW   APPEARANCE CLEAR    CLOUDY !   SPECIFIC GRAVITY 1.001 - 1.035    1.014   PH 5.0 - 8.0    5.5   PROTEIN NEGATIVE    TRACE !   GLUCOSE NEGATIVE    NEGATIVE   OCCULT BLOOD NEGATIVE    2+ !   KETONES NEGATIVE    NEGATIVE   BILIRUBIN NEGATIVE    NEGATIVE   NITRITE NEGATIVE    POSITIVE !   LEUKOCYTE ESTERASE NEGATIVE    3+ !   WBC < OR = 5 /HPF   > OR = 60 !   RBC < OR = 2 /HPF   0-2   SQUAMOUS EPITHELIAL CELLS < OR = 5 /HPF   0-5   BACTERIA NONE SEEN /HPF   MANY !   HYALINE CAST NONE SEEN /LPF   0-5 !   NOTE    COMMENT ONLY   (H): Data is abnormally high  !: Data is abnormal    Review of Systems   Respiratory:  Negative for shortness of breath.    Cardiovascular:  Negative for chest pain.   Gastrointestinal:  Positive for constipation. Negative for diarrhea.   Genitourinary:  Negative for dysuria, frequency and urgency.       Current Medications[1]    Objective   /66   Pulse 65   Resp 18   Ht (!) 1.524 m (5')   Wt 84.6 kg (186 lb 9.6 oz)   LMP  (LMP Unknown)   BMI 36.44 kg/m²     Physical Exam  Constitutional:       Appearance: Normal appearance.   HENT:      Mouth/Throat:      Mouth: Mucous membranes are moist.      Pharynx: Oropharynx is clear.   Eyes:      Conjunctiva/sclera: Conjunctivae normal.      Pupils: Pupils are equal, round, and reactive to light.   Cardiovascular:      Rate and Rhythm: Normal rate and regular rhythm.      Heart sounds: Normal heart sounds.   Pulmonary:      Effort: Pulmonary effort is normal.      Breath sounds: Normal breath sounds.   Abdominal:      General: Bowel sounds are normal. There is no distension.      Palpations: Abdomen is soft. There is no mass.      Tenderness: There is no abdominal tenderness.   Lymphadenopathy:      Cervical: No cervical adenopathy.   Skin:     General: Skin is warm and dry.   Neurological:      General: No focal deficit present.         Assessment/Plan   Problem List Items Addressed This Visit        Benign essential hypertension (Chronic)    Controlled   Remain on Metoprolol 25 mg daily and Lisinopril 20 mg daily.          Type 2 diabetes mellitus with hyperglycemia, without long-term current use of insulin - Primary (Chronic)    Checking her blood glucose at home via her CGM  Remain on Tirzepatide 15 mg weekly and Glipizide 2.5 mg daily for now.           Relevant Orders    POCT glycosylated hemoglobin (Hb A1C) manually resulted    MDD (recurrent major depressive disorder) in remission    Mood has improved and is stable  Remain on Escitalopram 10 mg daily         Relevant Medications    traZODone (Desyrel) 50 mg tablet    Hypothyroidism due to Hashimoto's thyroiditis    Continue taking levothyroxine 75 mcg daily.  Labs ordered today.            Relevant Orders    TSH    T4, free    Insomnia due to psychological stress    Trazodone is helping at night.   She is only taking 25mg at night.   Can take up to 50mg nightly.         Relevant Medications    traZODone (Desyrel) 50 mg tablet         Please return to see me in 3 months for a 30 minute follow up.     Scribe Attestation  By signing my name below, I, Cong Rivera   attest that this documentation has been prepared under the direction and in the presence of Leighann Thomas DO.         [1]   Current Outpatient Medications:     aspirin 81 mg EC tablet, Take 1 tablet (81 mg) by mouth once daily., Disp: , Rfl:     blood sugar diagnostic (OneTouch Verio test strips) strip, check sugar three times a day, in morning, before dinner and before bedtime, Disp: , Rfl:     co-enzyme Q-10 30 mg capsule, Take 1 capsule (30 mg) by mouth once daily., Disp: , Rfl:     escitalopram (Lexapro) 10 mg tablet, Take 1 tablet (10 mg) by mouth once daily., Disp: 90 tablet, Rfl: 3    hydrocortisone 2.5 % cream, APPLY GENEROUSLY TO AFFECTED AREAS BEFORE AND AFTER SWIMMING., Disp: , Rfl:     levothyroxine (Synthroid, Levoxyl) 75 mcg tablet, Take 1 tablet (75 mcg) by mouth early in  the morning.. Take on an empty stomach at the same time each day, either 30 to 60 minutes prior to breakfast NEW DOSE, Disp: 90 tablet, Rfl: 1    lisinopril 20 mg tablet, Take 1 tablet (20 mg) by mouth once daily., Disp: 90 tablet, Rfl: 3    LORazepam (Ativan) 0.5 mg tablet, One half or one tablet up to 3 times a day as needed for anxiety., Disp: 21 tablet, Rfl: 0    meloxicam (Mobic) 7.5 mg tablet, Take 1 tablet (7.5 mg) by mouth once daily., Disp: 90 tablet, Rfl: 3    metoprolol succinate XL (Toprol-XL) 25 mg 24 hr tablet, Take 1 tablet (25 mg) by mouth once daily., Disp: 90 tablet, Rfl: 3    tirzepatide (Mounjaro) 15 mg/0.5 mL pen injector, Inject 15 mg under the skin every 7 days., Disp: 6 mL, Rfl: 1    traZODone (Desyrel) 50 mg tablet, Take 0.5 tablets (25 mg) by mouth as needed at bedtime for sleep., Disp: 90 tablet, Rfl: 0    triamcinolone (Kenalog) 0.1 % ointment, Apply to affected areas twice a day as needed.  Use a moisturizer in between., Disp: 60 g, Rfl: 0

## 2025-07-10 NOTE — ASSESSMENT & PLAN NOTE
A1c 7.1  Checking her blood glucose at home via her CGM  Remain on Tirzepatide 15 mg weekly and Glipizide 2.5 mg daily for now.

## 2025-07-21 ENCOUNTER — APPOINTMENT (OUTPATIENT)
Dept: PHARMACY | Facility: HOSPITAL | Age: 70
End: 2025-07-21
Payer: MEDICARE

## 2025-07-21 DIAGNOSIS — E11.65 TYPE 2 DIABETES MELLITUS WITH HYPERGLYCEMIA, WITHOUT LONG-TERM CURRENT USE OF INSULIN: Primary | ICD-10-CM

## 2025-07-21 RX ORDER — TIRZEPATIDE 15 MG/.5ML
15 INJECTION, SOLUTION SUBCUTANEOUS
Qty: 6 ML | Refills: 1 | Status: SHIPPED | OUTPATIENT
Start: 2025-07-21

## 2025-07-21 NOTE — PROGRESS NOTES
Patient is sent at the request of Leighann Thomas DO for my opinion regarding Type 2 diabetes.  My final recommendations will be communicated back to the requesting provider by way of shared medical record.    Subjective     Maria Esther Schuster is a 68 y.o. year old female patient with type two diabetes mellitus, class II obesity, hypertension, statin myopathy/statin induced myositis, hyperlipidemia and MDD referred for diabetes medication management.    Endorses some constipation that is managed with a fiber supplement. Additionally endorses nausea but it is infrequent. Most commonly nauseated after right after her injection. Has completed 2 injections of Mounjaro 15 mg.     Weight (goal <175 lbs)  194 lbs 10/30/24  194 lbs 12/23/24  194 lbs 1/20/25  192 lbs 2/17/2025  194 lbs 3/17/2025  195 lbs 4/4/2025  191 lbs 5/19/2025  185 lbs 7/21/2025    Past Medical History:  She has a past medical history of Angina pectoris (05/22/2023), Benign essential hypertension (05/22/2023), Coronary artery disease (05/22/2023), Hyperlipidemia (11/10/2023), Muscle pain (05/22/2023), Personal history of other specified conditions, Type 2 diabetes mellitus with hyperglycemia, without long-term current use of insulin (08/29/2023), and Urinary tract infection, site not specified (01/25/2021).    Past Surgical History:  She has a past surgical history that includes Breast biopsy (Right, 11/13/2023); Other surgical history (02/06/2019); Other surgical history (05/11/2022); Other surgical history (05/11/2022); Other surgical history (03/10/2022); Other surgical history (03/10/2022); and CT angio coronary art with heartflow if score >30% (04/12/2022).    Social History:  She reports that she has never smoked. She has never been exposed to tobacco smoke. She has never used smokeless tobacco. She reports that she does not drink alcohol and does not use drugs.    Family History:  Family History   Problem Relation Name Age of Onset    Heart attack  Mother      Other (Coronary artery bypass surgery) Father      Diabetes type II Father          With complication, with long term current use of insulin     Allergies:  Statins-hmg-coa reductase inhibitors    Exercise: Walking a mile per day and going to gym 3 days per week for ~30-45 minutes.     Current monitoring regimen:   Patient is using: glucometer  Glucose Monitoring: takes BG every morning and most afternoons     Any episodes of hypoglycemia? no    Adverse Effects:   No reported ADRs during encounter    Objective     Last Recorded Vitals:  BP Readings from Last 6 Encounters:   07/10/25 101/66   04/10/25 103/58   02/05/25 122/74   01/09/25 118/78   09/05/24 122/72   07/22/24 132/68     Wt Readings from Last 6 Encounters:   07/10/25 84.6 kg (186 lb 9.6 oz)   04/10/25 89.6 kg (197 lb 9.6 oz)   04/09/25 87.5 kg (193 lb)   02/05/25 87.5 kg (192 lb 12.8 oz)   01/09/25 88 kg (194 lb)   09/05/24 86.1 kg (189 lb 14.4 oz)     Diabetes Pharmacotherapy:    Mounjaro 15 mg once weekly    Previous treatment:  Metformin - diarrhea     Primary Prevention:   - Statin? No  - ACE-I/ARB? Yes  - Aspirin? No    Pertinent PMH Review:  - PMH of Pancreatitis: No  - PMH of Retinopathy: No  - PMH of Urinary Tract Infections: Yes   - PMH or FH of MEN Type II/MTC: No    Lab Review  Lab Results   Component Value Date    BILITOT 0.5 01/02/2025    CALCIUM 9.2 01/02/2025    CO2 28 01/02/2025     01/02/2025    CREATININE 0.87 01/02/2025    GLUCOSE 171 (H) 01/02/2025    ALKPHOS 70 01/02/2025    K 4.9 01/02/2025    PROT 6.7 01/02/2025     01/02/2025    AST 17 01/02/2025    ALT 13 01/02/2025    BUN 20 01/02/2025    ANIONGAP 12 01/02/2025    ALBUMIN 4.4 01/02/2025    GFRF >90 05/17/2023     Lab Results   Component Value Date    TRIG 131 01/02/2025    CHOL 224 (H) 01/02/2025    LDLCALC 144 (H) 01/02/2025    HDL 53.5 01/02/2025     Lab Results   Component Value Date    HGBA1C 7.1 (A) 07/10/2025    HGBA1C 7.4 (H) 04/03/2025    HGBA1C  "6.9 (H) 01/02/2025     Component      Latest Ref Rng 11/2/2023   Albumin, Urine Random      Not established mg/L 7.0    Creatinine, Urine Random      20.0 - 320.0 mg/dL 135.1    Albumin/Creatine Ratio      <30.0 ug/mg Creat 5.2      No components found for: \"UACR\"  The 10-year ASCVD risk score (Jose Alfredo GARDNER, et al., 2019) is: 11.6%    Values used to calculate the score:      Age: 69 years      Clincally relevant sex: Female      Is Non- : No      Diabetic: Yes      Tobacco smoker: No      Systolic Blood Pressure: 101 mmHg      Is BP treated: Yes      HDL Cholesterol: 53.5 mg/dL      Total Cholesterol: 224 mg/dL    Health Maintenance:   Urine Albumin: 11/2/2023 - WNL     Drug Interactions:  Glipizide + Ozempic: Glucagon-Like Peptide-1 Agonists may enhance the hypoglycemic effect of Sulfonylureas    Assessment/Plan   Problem List Items Addressed This Visit       Type 2 diabetes mellitus with hyperglycemia, without long-term current use of insulin - Primary (Chronic)    Relevant Medications    tirzepatide (Mounjaro) 15 mg/0.5 mL pen injector    Other Relevant Orders    Referral to Clinical Pharmacy     Other Visit Diagnoses         BMI 38.0-38.9,adult        Relevant Orders    Referral to Clinical Pharmacy        Patients diabetes is borderline controlled with most recent A1c of 7.1% (Goal < 7%). Patient on max dose GLP-1 with tolerable side effects. Plan to continue current therapy and encourage lifestyle changes to improve A1c further.   Increase:   Mounjaro 15 mg once weekly   Glipizide 2.5 mg once daily with largest meal of the day  Patient notably statin, ezetimibe, and PCSK9 intolerant  Decrease carbohydrates in diet and increase physical activity (goal 150 minutes/week)  [facility] Patient Assistance valid through 3/27/2026  Follow-up: 10/20/2025 at 9:40 am  PCP Follow-Up: 11/5/25    Michael Brooke PharmD    Continue all meds under the continuation of care with the referring provider and clinical " pharmacy team.

## 2025-08-01 ENCOUNTER — TELEPHONE (OUTPATIENT)
Dept: PRIMARY CARE | Facility: CLINIC | Age: 70
End: 2025-08-01
Payer: MEDICARE

## 2025-08-01 DIAGNOSIS — F51.02 INSOMNIA DUE TO PSYCHOLOGICAL STRESS: ICD-10-CM

## 2025-08-01 RX ORDER — TRAZODONE HYDROCHLORIDE 50 MG/1
25 TABLET ORAL NIGHTLY PRN
Qty: 90 TABLET | Refills: 1 | Status: SHIPPED | OUTPATIENT
Start: 2025-08-01 | End: 2026-08-01

## 2025-08-01 NOTE — TELEPHONE ENCOUNTER
Patient calling for refill    Trazodone 50 mg tablet  Q-90  R-0  Take 0.5 tablets (25 mg) by mouth as needed at bedtime for sleep    [facility] [place]     Maria Esther

## 2025-08-04 ENCOUNTER — APPOINTMENT (OUTPATIENT)
Dept: CARDIOLOGY | Facility: CLINIC | Age: 70
End: 2025-08-04
Payer: MEDICARE

## 2025-08-10 PROBLEM — K63.5 POLYP OF COLON: Status: RESOLVED | Noted: 2024-03-14 | Resolved: 2025-08-10

## 2025-08-11 ENCOUNTER — OFFICE VISIT (OUTPATIENT)
Dept: CARDIOLOGY | Facility: CLINIC | Age: 70
End: 2025-08-11
Payer: MEDICARE

## 2025-08-11 VITALS
OXYGEN SATURATION: 97 % | BODY MASS INDEX: 35.94 KG/M2 | SYSTOLIC BLOOD PRESSURE: 123 MMHG | HEART RATE: 66 BPM | DIASTOLIC BLOOD PRESSURE: 58 MMHG | WEIGHT: 184 LBS

## 2025-08-11 DIAGNOSIS — T46.6X5A STATIN-INDUCED MYOSITIS: Primary | ICD-10-CM

## 2025-08-11 DIAGNOSIS — I10 BENIGN ESSENTIAL HYPERTENSION: Chronic | ICD-10-CM

## 2025-08-11 DIAGNOSIS — M60.9 STATIN-INDUCED MYOSITIS: Primary | ICD-10-CM

## 2025-08-11 DIAGNOSIS — R06.09 DYSPNEA ON EXERTION: ICD-10-CM

## 2025-08-11 DIAGNOSIS — I25.10 CORONARY ARTERY DISEASE INVOLVING NATIVE CORONARY ARTERY OF NATIVE HEART WITHOUT ANGINA PECTORIS: Chronic | ICD-10-CM

## 2025-08-11 DIAGNOSIS — E78.2 MIXED HYPERLIPIDEMIA: Chronic | ICD-10-CM

## 2025-08-11 PROCEDURE — 3078F DIAST BP <80 MM HG: CPT | Performed by: INTERNAL MEDICINE

## 2025-08-11 PROCEDURE — 99214 OFFICE O/P EST MOD 30 MIN: CPT | Performed by: INTERNAL MEDICINE

## 2025-08-11 PROCEDURE — 93005 ELECTROCARDIOGRAM TRACING: CPT | Performed by: INTERNAL MEDICINE

## 2025-08-11 PROCEDURE — 4010F ACE/ARB THERAPY RXD/TAKEN: CPT | Performed by: INTERNAL MEDICINE

## 2025-08-11 PROCEDURE — 3051F HG A1C>EQUAL 7.0%<8.0%: CPT | Performed by: INTERNAL MEDICINE

## 2025-08-11 PROCEDURE — 99212 OFFICE O/P EST SF 10 MIN: CPT

## 2025-08-11 PROCEDURE — 1036F TOBACCO NON-USER: CPT | Performed by: INTERNAL MEDICINE

## 2025-08-11 PROCEDURE — 3074F SYST BP LT 130 MM HG: CPT | Performed by: INTERNAL MEDICINE

## 2025-08-11 PROCEDURE — 1160F RVW MEDS BY RX/DR IN RCRD: CPT | Performed by: INTERNAL MEDICINE

## 2025-08-11 PROCEDURE — 1159F MED LIST DOCD IN RCRD: CPT | Performed by: INTERNAL MEDICINE

## 2025-08-11 ASSESSMENT — ENCOUNTER SYMPTOMS
OCCASIONAL FEELINGS OF UNSTEADINESS: 0
LOSS OF SENSATION IN FEET: 0
DEPRESSION: 0

## 2025-08-17 LAB
ATRIAL RATE: 65 BPM
P AXIS: 60 DEGREES
P OFFSET: 191 MS
P ONSET: 144 MS
PR INTERVAL: 148 MS
Q ONSET: 218 MS
QRS COUNT: 11 BEATS
QRS DURATION: 84 MS
QT INTERVAL: 416 MS
QTC CALCULATION(BAZETT): 432 MS
QTC FREDERICIA: 427 MS
R AXIS: 46 DEGREES
T AXIS: 65 DEGREES
T OFFSET: 426 MS
VENTRICULAR RATE: 65 BPM

## 2025-08-29 ENCOUNTER — TELEPHONE (OUTPATIENT)
Dept: PRIMARY CARE | Facility: CLINIC | Age: 70
End: 2025-08-29
Payer: MEDICARE

## 2025-10-20 ENCOUNTER — APPOINTMENT (OUTPATIENT)
Dept: PHARMACY | Facility: HOSPITAL | Age: 70
End: 2025-10-20
Payer: MEDICARE

## 2025-11-05 ENCOUNTER — APPOINTMENT (OUTPATIENT)
Dept: PRIMARY CARE | Facility: CLINIC | Age: 70
End: 2025-11-05
Payer: MEDICARE